# Patient Record
Sex: MALE | Race: BLACK OR AFRICAN AMERICAN | NOT HISPANIC OR LATINO | URBAN - METROPOLITAN AREA
[De-identification: names, ages, dates, MRNs, and addresses within clinical notes are randomized per-mention and may not be internally consistent; named-entity substitution may affect disease eponyms.]

---

## 2019-12-21 ENCOUNTER — EMERGENCY (EMERGENCY)
Facility: HOSPITAL | Age: 64
LOS: 1 days | Discharge: HOME | End: 2019-12-21
Attending: EMERGENCY MEDICINE | Admitting: EMERGENCY MEDICINE
Payer: COMMERCIAL

## 2019-12-21 VITALS
SYSTOLIC BLOOD PRESSURE: 122 MMHG | TEMPERATURE: 98 F | DIASTOLIC BLOOD PRESSURE: 86 MMHG | OXYGEN SATURATION: 98 % | HEART RATE: 76 BPM | RESPIRATION RATE: 18 BRPM

## 2019-12-21 VITALS
OXYGEN SATURATION: 100 % | WEIGHT: 238.98 LBS | RESPIRATION RATE: 18 BRPM | TEMPERATURE: 98 F | DIASTOLIC BLOOD PRESSURE: 97 MMHG | HEART RATE: 142 BPM | SYSTOLIC BLOOD PRESSURE: 147 MMHG | HEIGHT: 69 IN

## 2019-12-21 DIAGNOSIS — Z95.5 PRESENCE OF CORONARY ANGIOPLASTY IMPLANT AND GRAFT: Chronic | ICD-10-CM

## 2019-12-21 LAB
ALBUMIN SERPL ELPH-MCNC: 4.4 G/DL — SIGNIFICANT CHANGE UP (ref 3.3–5)
ALP SERPL-CCNC: 93 U/L — SIGNIFICANT CHANGE UP (ref 40–120)
ALT FLD-CCNC: 14 U/L — SIGNIFICANT CHANGE UP (ref 10–45)
ANION GAP SERPL CALC-SCNC: 14 MMOL/L — SIGNIFICANT CHANGE UP (ref 5–17)
APTT BLD: 42.5 SEC — HIGH (ref 27.5–36.3)
AST SERPL-CCNC: 21 U/L — SIGNIFICANT CHANGE UP (ref 10–40)
BASOPHILS # BLD AUTO: 0.04 K/UL — SIGNIFICANT CHANGE UP (ref 0–0.2)
BASOPHILS NFR BLD AUTO: 0.5 % — SIGNIFICANT CHANGE UP (ref 0–2)
BILIRUB SERPL-MCNC: 0.5 MG/DL — SIGNIFICANT CHANGE UP (ref 0.2–1.2)
BUN SERPL-MCNC: 20 MG/DL — SIGNIFICANT CHANGE UP (ref 7–23)
CALCIUM SERPL-MCNC: 9.7 MG/DL — SIGNIFICANT CHANGE UP (ref 8.4–10.5)
CHLORIDE SERPL-SCNC: 102 MMOL/L — SIGNIFICANT CHANGE UP (ref 96–108)
CK MB CFR SERPL CALC: 5.9 NG/ML — SIGNIFICANT CHANGE UP (ref 0–6.7)
CK SERPL-CCNC: 368 U/L — HIGH (ref 30–200)
CO2 SERPL-SCNC: 23 MMOL/L — SIGNIFICANT CHANGE UP (ref 22–31)
CREAT SERPL-MCNC: 1.15 MG/DL — SIGNIFICANT CHANGE UP (ref 0.5–1.3)
EOSINOPHIL # BLD AUTO: 0.21 K/UL — SIGNIFICANT CHANGE UP (ref 0–0.5)
EOSINOPHIL NFR BLD AUTO: 2.9 % — SIGNIFICANT CHANGE UP (ref 0–6)
GLUCOSE SERPL-MCNC: 155 MG/DL — HIGH (ref 70–99)
HCT VFR BLD CALC: 41 % — SIGNIFICANT CHANGE UP (ref 39–50)
HGB BLD-MCNC: 12.6 G/DL — LOW (ref 13–17)
IMM GRANULOCYTES NFR BLD AUTO: 0.7 % — SIGNIFICANT CHANGE UP (ref 0–1.5)
INR BLD: 1.87 — HIGH (ref 0.88–1.16)
LYMPHOCYTES # BLD AUTO: 1.3 K/UL — SIGNIFICANT CHANGE UP (ref 1–3.3)
LYMPHOCYTES # BLD AUTO: 17.8 % — SIGNIFICANT CHANGE UP (ref 13–44)
MCHC RBC-ENTMCNC: 26.5 PG — LOW (ref 27–34)
MCHC RBC-ENTMCNC: 30.7 GM/DL — LOW (ref 32–36)
MCV RBC AUTO: 86.3 FL — SIGNIFICANT CHANGE UP (ref 80–100)
MONOCYTES # BLD AUTO: 0.67 K/UL — SIGNIFICANT CHANGE UP (ref 0–0.9)
MONOCYTES NFR BLD AUTO: 9.2 % — SIGNIFICANT CHANGE UP (ref 2–14)
NEUTROPHILS # BLD AUTO: 5.02 K/UL — SIGNIFICANT CHANGE UP (ref 1.8–7.4)
NEUTROPHILS NFR BLD AUTO: 68.9 % — SIGNIFICANT CHANGE UP (ref 43–77)
NRBC # BLD: 0 /100 WBCS — SIGNIFICANT CHANGE UP (ref 0–0)
PLATELET # BLD AUTO: 316 K/UL — SIGNIFICANT CHANGE UP (ref 150–400)
POTASSIUM SERPL-MCNC: 3.8 MMOL/L — SIGNIFICANT CHANGE UP (ref 3.5–5.3)
POTASSIUM SERPL-SCNC: 3.8 MMOL/L — SIGNIFICANT CHANGE UP (ref 3.5–5.3)
PROT SERPL-MCNC: 7.9 G/DL — SIGNIFICANT CHANGE UP (ref 6–8.3)
PROTHROM AB SERPL-ACNC: 21.5 SEC — HIGH (ref 10–12.9)
RBC # BLD: 4.75 M/UL — SIGNIFICANT CHANGE UP (ref 4.2–5.8)
RBC # FLD: 14.2 % — SIGNIFICANT CHANGE UP (ref 10.3–14.5)
SODIUM SERPL-SCNC: 139 MMOL/L — SIGNIFICANT CHANGE UP (ref 135–145)
TROPONIN T SERPL-MCNC: 0.01 NG/ML — SIGNIFICANT CHANGE UP (ref 0–0.01)
TROPONIN T SERPL-MCNC: 0.02 NG/ML — HIGH (ref 0–0.01)
WBC # BLD: 7.29 K/UL — SIGNIFICANT CHANGE UP (ref 3.8–10.5)
WBC # FLD AUTO: 7.29 K/UL — SIGNIFICANT CHANGE UP (ref 3.8–10.5)

## 2019-12-21 PROCEDURE — 80053 COMPREHEN METABOLIC PANEL: CPT

## 2019-12-21 PROCEDURE — 82553 CREATINE MB FRACTION: CPT

## 2019-12-21 PROCEDURE — 82550 ASSAY OF CK (CPK): CPT

## 2019-12-21 PROCEDURE — 93010 ELECTROCARDIOGRAM REPORT: CPT

## 2019-12-21 PROCEDURE — 93005 ELECTROCARDIOGRAM TRACING: CPT

## 2019-12-21 PROCEDURE — 85025 COMPLETE CBC W/AUTO DIFF WBC: CPT

## 2019-12-21 PROCEDURE — 99291 CRITICAL CARE FIRST HOUR: CPT

## 2019-12-21 PROCEDURE — 36415 COLL VENOUS BLD VENIPUNCTURE: CPT

## 2019-12-21 PROCEDURE — 96375 TX/PRO/DX INJ NEW DRUG ADDON: CPT

## 2019-12-21 PROCEDURE — 71045 X-RAY EXAM CHEST 1 VIEW: CPT | Mod: 26

## 2019-12-21 PROCEDURE — 96374 THER/PROPH/DIAG INJ IV PUSH: CPT

## 2019-12-21 PROCEDURE — 85730 THROMBOPLASTIN TIME PARTIAL: CPT

## 2019-12-21 PROCEDURE — 99291 CRITICAL CARE FIRST HOUR: CPT | Mod: 25

## 2019-12-21 PROCEDURE — 85610 PROTHROMBIN TIME: CPT

## 2019-12-21 PROCEDURE — 84484 ASSAY OF TROPONIN QUANT: CPT

## 2019-12-21 PROCEDURE — 71045 X-RAY EXAM CHEST 1 VIEW: CPT

## 2019-12-21 RX ORDER — DILTIAZEM HCL 120 MG
20 CAPSULE, EXT RELEASE 24 HR ORAL ONCE
Refills: 0 | Status: COMPLETED | OUTPATIENT
Start: 2019-12-21 | End: 2019-12-21

## 2019-12-21 RX ORDER — ADENOSINE 3 MG/ML
6 INJECTION INTRAVENOUS ONCE
Refills: 0 | Status: COMPLETED | OUTPATIENT
Start: 2019-12-21 | End: 2019-12-21

## 2019-12-21 RX ORDER — SODIUM CHLORIDE 9 MG/ML
1000 INJECTION INTRAMUSCULAR; INTRAVENOUS; SUBCUTANEOUS ONCE
Refills: 0 | Status: COMPLETED | OUTPATIENT
Start: 2019-12-21 | End: 2019-12-21

## 2019-12-21 RX ORDER — METOPROLOL TARTRATE 50 MG
5 TABLET ORAL ONCE
Refills: 0 | Status: COMPLETED | OUTPATIENT
Start: 2019-12-21 | End: 2019-12-21

## 2019-12-21 RX ADMIN — Medication 20 MILLIGRAM(S): at 14:06

## 2019-12-21 RX ADMIN — SODIUM CHLORIDE 1000 MILLILITER(S): 9 INJECTION INTRAMUSCULAR; INTRAVENOUS; SUBCUTANEOUS at 13:29

## 2019-12-21 RX ADMIN — ADENOSINE 6 MILLIGRAM(S): 3 INJECTION INTRAVENOUS at 13:15

## 2019-12-21 RX ADMIN — Medication 5 MILLIGRAM(S): at 13:23

## 2019-12-21 NOTE — ED ADULT NURSE REASSESSMENT NOTE - NS ED NURSE REASSESS COMMENT FT1
Patient resting comfortably, no chest pain, SOB or palpitations, AFib on cardiac monitor, vital signs stable, HR and BP improved.  Repeat Trop 1630H pending.

## 2019-12-21 NOTE — ED ADULT NURSE REASSESSMENT NOTE - NS ED NURSE REASSESS COMMENT FT1
Patient a/oX 3, no chest pain or SOB or palpitations.  Cardizem 20mg IVP adminsitered as ordered by  Director w/ good effects, HR improved to 70  /71.  Dr. Beckham made aware and repeat EKG given to MD.  For repeat Troponin 1630H.  Stable and comfortable, will continue to monitor.

## 2019-12-21 NOTE — ED PROVIDER NOTE - CLINICAL SUMMARY MEDICAL DECISION MAKING FREE TEXT BOX
Pt sent for asymptomatic tachycardia, ekg w reg, rapid, narrow complex w/o consistent p waves - suspect aflutter but ? mat, no sx to support sinus tach.  Will try adenosine, consider beta blocker iv since pt on po, labs, discuss w pt's covering ep.  Reassess.

## 2019-12-21 NOTE — ED ADULT NURSE REASSESSMENT NOTE - NS ED NURSE REASSESS COMMENT FT1
Patient a/oX 3, states feeling better, no chest pain or palpitations, vital signs stable.  2ndTrop resulted.  Discharged to home in stable condition.

## 2019-12-21 NOTE — ED PROVIDER NOTE - OBJECTIVE STATEMENT
63 yo male h/o afib/flutter s/p cardioversion, cardiomyopathy, cad s/p stent, htn, dm, pvd sent from his work physical for asymptomatic tachycardia.  No c/o cp, palpitations, sob, dizziness, syncope.  Pt feeling well.  EKG read as sinus tach at office.  Pt reports compliance w his meds.  No n/v/d, fever, black/bloody stools.

## 2019-12-21 NOTE — ED ADULT NURSE NOTE - CHPI ED NUR SYMPTOMS NEG
no chills/no congestion/no chest pain/no syncope/no shortness of breath/no vomiting/no diaphoresis/no nausea/no dizziness/no fever/no back pain

## 2019-12-21 NOTE — ED PROVIDER NOTE - PROGRESS NOTE DETAILS
Pt w/ ?able flutter w adenosine, no change in hr w metoprolol iv but improved hr after cardizem 20.  Pt discussed w Dr Santos (covering for Dr Nolen) - he recommends increasing his metoprolol ER 50 mg to bid and continuing his xarelto.  He feels his trop elevation is likely rate related; he also feels pt can be dc'd even if hr increases again and fu on Monday in the office.  Will plan to trend trop (initial 0.02) and monitor hr.  Pt remains asymptomatic.  Repeat ekg after cardizem w a flutter, 71 bpm. Repeat trop improved, hr ro.  Sameer rand.

## 2019-12-21 NOTE — ED PROVIDER NOTE - DIAGNOSTIC INTERPRETATION
ER Physician:  Leti Director  CHEST XRAY INTERPRETATION: lungs clear, heart shadow normal, bony structures intact

## 2019-12-21 NOTE — ED ADULT NURSE NOTE - PMH
Atrial flutter  Atrial flutter  Essential hypertension  Hypertension  Primary cardiomyopathy  Cardiomyopathy  Type 2 diabetes mellitus  Diabetes

## 2019-12-21 NOTE — ED PROVIDER NOTE - PATIENT PORTAL LINK FT
You can access the FollowMyHealth Patient Portal offered by [facility] [place] by registering at the following website: [email or web address]. By joining [facility]’s FollowMyHealth portal, you will also be able to view your health information using other applications (apps) compatible with our system.

## 2019-12-21 NOTE — ED PROVIDER NOTE - NSFOLLOWUPINSTRUCTIONS_ED_ALL_ED_FT
Atrial Flutter    Please increase your metoprolol ER 50 mg to twice a day.  Continue your other medications as before including your xarelto.  Follow up with Dr Milan on Monday - call at 9a to get an appointment time; tell them you were in the ER and need to be seen.  Return for increased pain, difficulty breathing, palpitations, any other concerns.     Atrial Flutter     Atrial flutter is a type of abnormal heart rhythm (arrhythmia). The heart has an electrical system that tells the heart how to beat. In atrial flutter, the signals move rapidly in the top chambers of the heart (the atria). This makes your heart beat very fast. Atrial flutter can come and go, or it can be permanent.  If this condition is not treated it can cause serious complications, such as stroke or weakened heart muscle (cardiomyopathy).  What are the causes?  This condition may be caused by:  A heart condition or problem, such as:  A heart attack.Heart failure.A heart valve problem.Heart surgery.A lung problem, such as:  A blood clot in the lungs (pulmonary embolism, or PE).Chronic obstructive pulmonary disease.Poorly controlled high blood pressure (hypertension).Overactive thyroid (hyperthyroidism).Caffeine.Some decongestant cold medicines.Low levels of minerals called electrolytes in the blood.Cocaine.What increases the risk?  You are more likely to develop this condition if:  You are an elderly adult.You are a man.You are obese.You have obstructive sleep apnea.You have a family history of atrial flutter.You have diabetes.What are the signs or symptoms?  Symptoms of this condition include:  A feeling that your heart is pounding or racing (palpitations).Shortness of breath.Chest pain.Feeling light-headed.Dizziness.Fainting.Low blood pressure (hypotension).Fatigue.Sometimes there are no symptoms associated with arrhythmia.  How is this diagnosed?  This condition may be diagnosed based on:  An electrocardiogram (ECG). This is a test that records the electrical signals in the heart.Ambulatory cardiac monitoring. This is a small recording device that is connected by wires to flat, sticky disks (electrodes) that are attached to your chest.An echocardiogram. This is a test that uses sound waves to create pictures of your heart.A transesophageal echocardiogram (KUMAR). In this test, a device is placed down your esophagus. This device then uses sounds waves to create even closer pictures of your heart.Stress test. This test records your heartbeat while you exercise and checks to see if the heart muscle is receiving adequate blood supply.How is this treated?  This condition may be treated with:  Medicines to:  Make your heart beat more slowly.Keep your heart in normal rhythm.Prevent a stroke.Cardioversion. This uses medicines or an electrical shock to make the heart beat normally.Ablation. This destroys the heart tissue that is causing the problem.In some cases, your health care provider will treat other underlying conditions.  Follow these instructions at home:  Medicines     Take over-the-counter and prescription medicines only as told by your health care provider.  Make sure you take your medicines exactly as told by your health care provider.Do not miss any doses.Do not take any new medicines without talking to your health care provider.Lifestyle     Eat heart-healthy foods. Talk with a dietitian to make an eating plan that is right for you.Do not use any products that contain nicotine or tobacco, such as cigarettes and e-cigarettes. If you need help quitting, ask your health care provider.Limit alcohol intake to no more than 1 drink per day for nonpregnant women and 2 drinks per day for men. One drink equals 12 oz of beer, 5 oz of wine, or 1½ oz of hard liquor.Try to reduce any stress. Stress can make your symptoms worse.Get screened for sleep apnea. If you have the condition, work with your health care provider to find a treatment that works for you.Do not use drugs.Avoid excessive caffeine.General instructions     Lose weight if your health care provider tells you to do that.Keep all follow-up visits as told by your health care provider. This is important.Contact a health care provider if:  Your symptoms get worse.You notice that your palpitations are increasing.Get help right away if:  You have any symptoms of a stroke. "BE FAST" is an easy way to remember the main warning signs of a stroke:  B - Balance. Signs are dizziness, sudden trouble walking, or loss of balance.E - Eyes. Signs are trouble seeing or a sudden change in vision.F - Face. Signs are sudden weakness or numbness of the face, or the face or eyelid drooping on one side.A - Arms. Signs are weakness or numbness in an arm. This happens suddenly and usually on one side of the body.S - Speech. Signs are sudden trouble speaking, slurred speech, or trouble understanding what people say.T - Time. Time to call emergency services. Write down what time symptoms started.You have other signs of a stroke, such as:  A sudden, severe headache with no known cause.Nausea or vomiting.Seizure.You have additional symptoms, such as:  Fainting.Shortness of breath.Pain or pressure in your chest.Suddenly feeling nauseous or suddenly vomiting.Increased sweating with no known cause.These symptoms may represent a serious problem that is an emergency. Do not wait to see if the symptoms will go away. Get medical help right away. Call your local emergency services (911 in the [place] Do not drive yourself to the hospital. Summary  Atrial flutter is an abnormal heart rhythm that can give you symptoms of palpitations, shortness of breath, or fatigue. Atrial flutter is often treated with medicines to keep your heart in a normal rhythm and to prevent a stroke.You should seek immediate help if you cannot catch your breath, have chest pain or pressure, or have weakness, especially on one side of your body.

## 2019-12-21 NOTE — ED ADULT NURSE NOTE - OBJECTIVE STATEMENT
Patient sent from PMD Dr. Chris, [facility] went for regular check up and noted on EKG elevated HR of 142, denies any chest pain, dizziness/syncope, no SOB or palpitations, no anxiety, no abdominal pain, non-diaphoretic, no nausea/vomitting, no fevers.States took Xarelto and Metoprolol as scheduled, Hx. of Afib and cardiac stent X 1 2015.  States the past month dyspnea on exertion.   Immediate upgrade,  Director at bedside.

## 2019-12-21 NOTE — ED PROVIDER NOTE - CARE PROVIDER_API CALL
Dru Nolen)  Cardiac Electrophysiology; Cardiology; Cardiovascular Disease  [address], [address] [place] [address]  Phone: [phone]  Fax: [phone]  Follow Up Time:

## 2019-12-21 NOTE — ED ADULT NURSE REASSESSMENT NOTE - NS ED NURSE REASSESS COMMENT FT1
Patient a/oX 3, non-anxious, non-diaphoretic, denies any chest pain , SOB or palpitations or dizziness. Afib on cardiac monitor, vital signs stable.  Right AC PIV #18 in place, all labs sent, no complications.   Director at bedside, administered VORB ADenosine 6mg rapid IVP 2 -3 sec followed by 20ml NSS flush and right arm elevated.  Rhythm strip printed, patient HR remains 140, tolerated procedure well.  VORB  Director Metoprolol 5mg slow IVP over 1 min administered as ordered;  Dr. Beckham made aware patient HR remains 140  /87.  Patient tolerated procedure well, no chest pain , SOB or any other symptom complaint.  NSS 1 L bolus ongoing, will continue to monitor.

## 2019-12-21 NOTE — ED ADULT TRIAGE NOTE - CHIEF COMPLAINT QUOTE
63 y/o male sent from PMD for palpitations. Pt . Pt denies chest pain, SOB. Pt reports that he was in a routine Doctor's visit when he was told he has rapid HR. Pt reports palpitations.

## 2019-12-21 NOTE — ED PROVIDER NOTE - PMH
Atrial flutter  Atrial flutter  CAD (coronary artery disease)    Essential hypertension  Hypertension  Primary cardiomyopathy  Cardiomyopathy  PVD (peripheral vascular disease)    Type 2 diabetes mellitus  Diabetes

## 2019-12-23 PROBLEM — I73.9 PERIPHERAL VASCULAR DISEASE, UNSPECIFIED: Chronic | Status: ACTIVE | Noted: 2019-12-21

## 2019-12-23 PROBLEM — I25.10 ATHEROSCLEROTIC HEART DISEASE OF NATIVE CORONARY ARTERY WITHOUT ANGINA PECTORIS: Chronic | Status: ACTIVE | Noted: 2019-12-21

## 2019-12-25 DIAGNOSIS — I48.92 UNSPECIFIED ATRIAL FLUTTER: ICD-10-CM

## 2019-12-25 DIAGNOSIS — R00.2 PALPITATIONS: ICD-10-CM

## 2020-01-08 ENCOUNTER — NON-APPOINTMENT (OUTPATIENT)
Age: 65
End: 2020-01-08

## 2020-01-08 ENCOUNTER — APPOINTMENT (OUTPATIENT)
Dept: HEART AND VASCULAR | Facility: CLINIC | Age: 65
End: 2020-01-08
Payer: COMMERCIAL

## 2020-01-08 VITALS
WEIGHT: 243 LBS | HEART RATE: 137 BPM | HEIGHT: 70 IN | DIASTOLIC BLOOD PRESSURE: 75 MMHG | SYSTOLIC BLOOD PRESSURE: 115 MMHG | BODY MASS INDEX: 34.79 KG/M2

## 2020-01-08 DIAGNOSIS — Z78.9 OTHER SPECIFIED HEALTH STATUS: ICD-10-CM

## 2020-01-08 PROCEDURE — 99215 OFFICE O/P EST HI 40 MIN: CPT | Mod: 25

## 2020-01-08 PROCEDURE — 93000 ELECTROCARDIOGRAM COMPLETE: CPT

## 2020-01-08 RX ORDER — OMEPRAZOLE 40 MG/1
40 CAPSULE, DELAYED RELEASE ORAL
Refills: 0 | Status: ACTIVE | COMMUNITY
Start: 2020-01-08

## 2020-01-08 RX ORDER — ASPIRIN ENTERIC COATED TABLETS 81 MG 81 MG/1
81 TABLET, DELAYED RELEASE ORAL
Refills: 0 | Status: ACTIVE | COMMUNITY
Start: 2020-01-08

## 2020-01-08 RX ORDER — SILDENAFIL 100 MG/1
100 TABLET, FILM COATED ORAL
Refills: 0 | Status: ACTIVE | COMMUNITY
Start: 2020-01-08

## 2020-01-10 ENCOUNTER — OUTPATIENT (OUTPATIENT)
Dept: OUTPATIENT SERVICES | Facility: HOSPITAL | Age: 65
LOS: 1 days | Discharge: HOME | End: 2020-01-10
Payer: COMMERCIAL

## 2020-01-10 DIAGNOSIS — Z95.5 PRESENCE OF CORONARY ANGIOPLASTY IMPLANT AND GRAFT: Chronic | ICD-10-CM

## 2020-01-10 PROCEDURE — 92960 CARDIOVERSION ELECTRIC EXT: CPT

## 2020-01-13 NOTE — HISTORY OF PRESENT ILLNESS
[FreeTextEntry1] : Mr Banks is a 64 year old male with HTN, diabetes and atrial flutter s/p ablation 2015, who was recently found to be in atrial fibrillation, who presents for follow up.\par \par He is maintained on xarelto.  He recently had a routine MD office appointment and was found to be in atrial fibrillation.  He denies any symptoms including palpitations, fatigue, SOB, syncope or near syncope.  He was recently restarted back on Metoprolol \par

## 2020-01-13 NOTE — DISCUSSION/SUMMARY
[FreeTextEntry1] : Mr Banks is a 64 year old male with HTN, diabetes and atrial flutter s/p ablation 2015, who was recently found to be in atrial fibrillation, who presents for follow up.  He is currently asymptomatic and compliant with anticoagulation with newly diagnosed atrial fibrillation.  We discussed the natural conduction system of the heart and what atrial flutter and atrial fibrillation is.  I would like to get him back into NSR to see if he notes a change in the way he feels.  He is amenable to proceeding with a cardioversion with low dose Sotalol.  If he notes an improvement we will proceed with an ablation in the future.  We discussed the procedure in detail including risks, benefits and alternatives.  He knows to call with any questions or concerns.

## 2020-01-13 NOTE — REVIEW OF SYSTEMS
[Fever] : no fever [Recent Weight Gain (___ Lbs)] : no recent weight gain [Chills] : no chills [Feeling Fatigued] : not feeling fatigued [Recent Weight Loss (___ Lbs)] : no recent weight loss [Negative] : Heme/Lymph

## 2020-01-13 NOTE — PHYSICAL EXAM
[General Appearance - In No Acute Distress] : no acute distress [Normal Oral Mucosa] : normal oral mucosa [Normal Conjunctiva] : the conjunctiva exhibited no abnormalities [Heart Rate And Rhythm] : heart rate and rhythm were normal [Heart Sounds] : normal S1 and S2 [Abnormal Walk] : normal gait [Nail Clubbing] : no clubbing of the fingernails [Skin Color & Pigmentation] : normal skin color and pigmentation [Oriented To Time, Place, And Person] : oriented to person, place, and time [General Appearance - Well Developed] : well developed [Normal Appearance] : normal appearance [Well Groomed] : well groomed [General Appearance - Well Nourished] : well nourished [No Deformities] : no deformities [Respiration, Rhythm And Depth] : normal respiratory rhythm and effort [Exaggerated Use Of Accessory Muscles For Inspiration] : no accessory muscle use [Auscultation Breath Sounds / Voice Sounds] : lungs were clear to auscultation bilaterally [Edema] : no peripheral edema present [] : no ischemic changes [Gait - Sufficient For Exercise Testing] : the gait was sufficient for exercise testing [Affect] : the affect was normal [Impaired Insight] : insight and judgment were intact [No Anxiety] : not feeling anxious [Mood] : the mood was normal

## 2020-01-21 DIAGNOSIS — I48.91 UNSPECIFIED ATRIAL FIBRILLATION: ICD-10-CM

## 2020-01-22 ENCOUNTER — APPOINTMENT (OUTPATIENT)
Dept: HEART AND VASCULAR | Facility: CLINIC | Age: 65
End: 2020-01-22
Payer: COMMERCIAL

## 2020-01-22 ENCOUNTER — NON-APPOINTMENT (OUTPATIENT)
Age: 65
End: 2020-01-22

## 2020-01-22 VITALS
BODY MASS INDEX: 34.36 KG/M2 | WEIGHT: 240 LBS | HEART RATE: 63 BPM | SYSTOLIC BLOOD PRESSURE: 132 MMHG | HEIGHT: 70 IN | DIASTOLIC BLOOD PRESSURE: 66 MMHG

## 2020-01-22 PROCEDURE — 93000 ELECTROCARDIOGRAM COMPLETE: CPT

## 2020-01-22 PROCEDURE — 99214 OFFICE O/P EST MOD 30 MIN: CPT | Mod: 25

## 2020-01-27 NOTE — HISTORY OF PRESENT ILLNESS
[FreeTextEntry1] : Mr Banks is a 64 year old male with HTN, diabetes and atrial flutter s/p ablation 2015, who was found to be in atrial fibrillation s/p cardioversion 1/10/20, who presents for follow up.\par \par He is maintained on xarelto.  He was found to be in asymptomatic atrial fibrillation and was found to be in afib.  He denied any palpitations, fatigue, SOB, syncope or near syncope.  He underwent a cardioversion and since then states he feels more energy when walking up stairs.  No other changes.  COmpliant with oral anticoagulation. \par

## 2020-01-27 NOTE — PHYSICAL EXAM
[Normal Appearance] : normal appearance [General Appearance - Well Developed] : well developed [Well Groomed] : well groomed [No Deformities] : no deformities [General Appearance - Well Nourished] : well nourished [Normal Conjunctiva] : the conjunctiva exhibited no abnormalities [General Appearance - In No Acute Distress] : no acute distress [Respiration, Rhythm And Depth] : normal respiratory rhythm and effort [Normal Oral Mucosa] : normal oral mucosa [Heart Rate And Rhythm] : heart rate and rhythm were normal [Auscultation Breath Sounds / Voice Sounds] : lungs were clear to auscultation bilaterally [Exaggerated Use Of Accessory Muscles For Inspiration] : no accessory muscle use [Edema] : no peripheral edema present [Abnormal Walk] : normal gait [Heart Sounds] : normal S1 and S2 [Gait - Sufficient For Exercise Testing] : the gait was sufficient for exercise testing [Skin Color & Pigmentation] : normal skin color and pigmentation [Impaired Insight] : insight and judgment were intact [Oriented To Time, Place, And Person] : oriented to person, place, and time [Affect] : the affect was normal [Mood] : the mood was normal [No Anxiety] : not feeling anxious [] : no rash

## 2020-01-27 NOTE — REVIEW OF SYSTEMS
[Negative] : Heme/Lymph [Fever] : no fever [Recent Weight Gain (___ Lbs)] : no recent weight gain [Chills] : no chills [Feeling Fatigued] : not feeling fatigued [Recent Weight Loss (___ Lbs)] : no recent weight loss

## 2020-01-27 NOTE — DISCUSSION/SUMMARY
[FreeTextEntry1] : Mr Banks is a 64 year old male with HTN, diabetes and atrial flutter s/p ablation 2015, who was recently found to be in atrial fibrillation s/p cardioversion, who presents for follow up.  He notes an improvement in the way he feels post cardioversion.  We discussed that this arrhythmia is likely to come back at some point and we are unclear when.  Options include observation, antiarrhythmic medications or an ablation.  He would like to wait and see how he does and if/when he has recurrent arrhythmia will consider an ablation.  He will follow up in 6 months or sooner if needed.  He knows to call with any questions or concerns.

## 2021-10-28 ENCOUNTER — INPATIENT (INPATIENT)
Facility: HOSPITAL | Age: 66
LOS: 1 days | Discharge: HOME | DRG: 274 | End: 2021-10-30
Attending: INTERNAL MEDICINE | Admitting: INTERNAL MEDICINE
Payer: COMMERCIAL

## 2021-10-28 VITALS
HEIGHT: 69 IN | SYSTOLIC BLOOD PRESSURE: 162 MMHG | TEMPERATURE: 98 F | RESPIRATION RATE: 18 BRPM | HEART RATE: 121 BPM | DIASTOLIC BLOOD PRESSURE: 116 MMHG | OXYGEN SATURATION: 99 % | WEIGHT: 223.99 LBS

## 2021-10-28 DIAGNOSIS — E11.9 TYPE 2 DIABETES MELLITUS WITHOUT COMPLICATIONS: ICD-10-CM

## 2021-10-28 DIAGNOSIS — Z95.5 PRESENCE OF CORONARY ANGIOPLASTY IMPLANT AND GRAFT: Chronic | ICD-10-CM

## 2021-10-28 DIAGNOSIS — I48.91 UNSPECIFIED ATRIAL FIBRILLATION: ICD-10-CM

## 2021-10-28 LAB
ALBUMIN SERPL ELPH-MCNC: 4.7 G/DL — SIGNIFICANT CHANGE UP (ref 3.3–5)
ALP SERPL-CCNC: 77 U/L — SIGNIFICANT CHANGE UP (ref 40–120)
ALT FLD-CCNC: 10 U/L — SIGNIFICANT CHANGE UP (ref 10–45)
ANION GAP SERPL CALC-SCNC: 12 MMOL/L — SIGNIFICANT CHANGE UP (ref 5–17)
APTT BLD: 34.5 SEC — SIGNIFICANT CHANGE UP (ref 27.5–35.5)
AST SERPL-CCNC: 19 U/L — SIGNIFICANT CHANGE UP (ref 10–40)
BASOPHILS # BLD AUTO: 0.03 K/UL — SIGNIFICANT CHANGE UP (ref 0–0.2)
BASOPHILS NFR BLD AUTO: 0.5 % — SIGNIFICANT CHANGE UP (ref 0–2)
BILIRUB SERPL-MCNC: 0.3 MG/DL — SIGNIFICANT CHANGE UP (ref 0.2–1.2)
BUN SERPL-MCNC: 24 MG/DL — HIGH (ref 7–23)
CALCIUM SERPL-MCNC: 9.4 MG/DL — SIGNIFICANT CHANGE UP (ref 8.4–10.5)
CHLORIDE SERPL-SCNC: 105 MMOL/L — SIGNIFICANT CHANGE UP (ref 96–108)
CO2 SERPL-SCNC: 22 MMOL/L — SIGNIFICANT CHANGE UP (ref 22–31)
CREAT SERPL-MCNC: 1.15 MG/DL — SIGNIFICANT CHANGE UP (ref 0.5–1.3)
EOSINOPHIL # BLD AUTO: 0.26 K/UL — SIGNIFICANT CHANGE UP (ref 0–0.5)
EOSINOPHIL NFR BLD AUTO: 4.1 % — SIGNIFICANT CHANGE UP (ref 0–6)
GLUCOSE BLDC GLUCOMTR-MCNC: 172 MG/DL — HIGH (ref 70–99)
GLUCOSE BLDC GLUCOMTR-MCNC: 84 MG/DL — SIGNIFICANT CHANGE UP (ref 70–99)
GLUCOSE SERPL-MCNC: 131 MG/DL — HIGH (ref 70–99)
HCT VFR BLD CALC: 41.5 % — SIGNIFICANT CHANGE UP (ref 39–50)
HGB BLD-MCNC: 13.3 G/DL — SIGNIFICANT CHANGE UP (ref 13–17)
IMM GRANULOCYTES NFR BLD AUTO: 0.5 % — SIGNIFICANT CHANGE UP (ref 0–1.5)
INR BLD: 1.2 — HIGH (ref 0.88–1.16)
LYMPHOCYTES # BLD AUTO: 1.1 K/UL — SIGNIFICANT CHANGE UP (ref 1–3.3)
LYMPHOCYTES # BLD AUTO: 17.2 % — SIGNIFICANT CHANGE UP (ref 13–44)
MCHC RBC-ENTMCNC: 28.2 PG — SIGNIFICANT CHANGE UP (ref 27–34)
MCHC RBC-ENTMCNC: 32 GM/DL — SIGNIFICANT CHANGE UP (ref 32–36)
MCV RBC AUTO: 88.1 FL — SIGNIFICANT CHANGE UP (ref 80–100)
MONOCYTES # BLD AUTO: 0.67 K/UL — SIGNIFICANT CHANGE UP (ref 0–0.9)
MONOCYTES NFR BLD AUTO: 10.5 % — SIGNIFICANT CHANGE UP (ref 2–14)
NEUTROPHILS # BLD AUTO: 4.3 K/UL — SIGNIFICANT CHANGE UP (ref 1.8–7.4)
NEUTROPHILS NFR BLD AUTO: 67.2 % — SIGNIFICANT CHANGE UP (ref 43–77)
NRBC # BLD: 0 /100 WBCS — SIGNIFICANT CHANGE UP (ref 0–0)
PLATELET # BLD AUTO: 226 K/UL — SIGNIFICANT CHANGE UP (ref 150–400)
POTASSIUM SERPL-MCNC: 4.4 MMOL/L — SIGNIFICANT CHANGE UP (ref 3.5–5.3)
POTASSIUM SERPL-SCNC: 4.4 MMOL/L — SIGNIFICANT CHANGE UP (ref 3.5–5.3)
PROT SERPL-MCNC: 7.6 G/DL — SIGNIFICANT CHANGE UP (ref 6–8.3)
PROTHROM AB SERPL-ACNC: 14.3 SEC — HIGH (ref 10.6–13.6)
RBC # BLD: 4.71 M/UL — SIGNIFICANT CHANGE UP (ref 4.2–5.8)
RBC # FLD: 13.2 % — SIGNIFICANT CHANGE UP (ref 10.3–14.5)
SARS-COV-2 RNA SPEC QL NAA+PROBE: NEGATIVE — SIGNIFICANT CHANGE UP
SODIUM SERPL-SCNC: 139 MMOL/L — SIGNIFICANT CHANGE UP (ref 135–145)
TROPONIN T SERPL-MCNC: 0.01 NG/ML — SIGNIFICANT CHANGE UP (ref 0–0.01)
WBC # BLD: 6.39 K/UL — SIGNIFICANT CHANGE UP (ref 3.8–10.5)
WBC # FLD AUTO: 6.39 K/UL — SIGNIFICANT CHANGE UP (ref 3.8–10.5)

## 2021-10-28 PROCEDURE — 93010 ELECTROCARDIOGRAM REPORT: CPT

## 2021-10-28 PROCEDURE — 75572 CT HRT W/3D IMAGE: CPT | Mod: 26

## 2021-10-28 PROCEDURE — 71045 X-RAY EXAM CHEST 1 VIEW: CPT | Mod: 26

## 2021-10-28 PROCEDURE — 99285 EMERGENCY DEPT VISIT HI MDM: CPT

## 2021-10-28 PROCEDURE — 99255 IP/OBS CONSLTJ NEW/EST HI 80: CPT

## 2021-10-28 RX ORDER — SOTALOL HCL 120 MG
40 TABLET ORAL
Refills: 0 | Status: DISCONTINUED | OUTPATIENT
Start: 2021-10-28 | End: 2021-10-30

## 2021-10-28 RX ORDER — GLUCAGON INJECTION, SOLUTION 0.5 MG/.1ML
1 INJECTION, SOLUTION SUBCUTANEOUS ONCE
Refills: 0 | Status: DISCONTINUED | OUTPATIENT
Start: 2021-10-28 | End: 2021-10-30

## 2021-10-28 RX ORDER — AMLODIPINE BESYLATE 2.5 MG/1
0 TABLET ORAL
Qty: 0 | Refills: 0 | DISCHARGE

## 2021-10-28 RX ORDER — METOPROLOL TARTRATE 50 MG
50 TABLET ORAL ONCE
Refills: 0 | Status: COMPLETED | OUTPATIENT
Start: 2021-10-28 | End: 2021-10-28

## 2021-10-28 RX ORDER — DEXTROSE 50 % IN WATER 50 %
25 SYRINGE (ML) INTRAVENOUS ONCE
Refills: 0 | Status: DISCONTINUED | OUTPATIENT
Start: 2021-10-28 | End: 2021-10-30

## 2021-10-28 RX ORDER — AMLODIPINE BESYLATE 2.5 MG/1
2.5 TABLET ORAL DAILY
Refills: 0 | Status: DISCONTINUED | OUTPATIENT
Start: 2021-10-28 | End: 2021-10-30

## 2021-10-28 RX ORDER — METOPROLOL TARTRATE 50 MG
50 TABLET ORAL DAILY
Refills: 0 | Status: DISCONTINUED | OUTPATIENT
Start: 2021-10-29 | End: 2021-10-30

## 2021-10-28 RX ORDER — ASPIRIN/CALCIUM CARB/MAGNESIUM 324 MG
81 TABLET ORAL DAILY
Refills: 0 | Status: DISCONTINUED | OUTPATIENT
Start: 2021-10-28 | End: 2021-10-30

## 2021-10-28 RX ORDER — SODIUM CHLORIDE 9 MG/ML
1000 INJECTION INTRAMUSCULAR; INTRAVENOUS; SUBCUTANEOUS
Refills: 0 | Status: DISCONTINUED | OUTPATIENT
Start: 2021-10-28 | End: 2021-10-29

## 2021-10-28 RX ORDER — HYDROCHLOROTHIAZIDE 25 MG
0 TABLET ORAL
Qty: 0 | Refills: 0 | DISCHARGE

## 2021-10-28 RX ORDER — RIVAROXABAN 15 MG-20MG
0 KIT ORAL
Qty: 0 | Refills: 0 | DISCHARGE

## 2021-10-28 RX ORDER — METOPROLOL TARTRATE 50 MG
0 TABLET ORAL
Qty: 0 | Refills: 0 | DISCHARGE

## 2021-10-28 RX ORDER — RIVAROXABAN 15 MG-20MG
1 KIT ORAL
Qty: 0 | Refills: 0 | DISCHARGE

## 2021-10-28 RX ORDER — RIVAROXABAN 15 MG-20MG
20 KIT ORAL
Refills: 0 | Status: DISCONTINUED | OUTPATIENT
Start: 2021-10-28 | End: 2021-10-29

## 2021-10-28 RX ORDER — SODIUM CHLORIDE 9 MG/ML
1000 INJECTION, SOLUTION INTRAVENOUS
Refills: 0 | Status: DISCONTINUED | OUTPATIENT
Start: 2021-10-28 | End: 2021-10-30

## 2021-10-28 RX ORDER — ASPIRIN/CALCIUM CARB/MAGNESIUM 324 MG
0 TABLET ORAL
Qty: 0 | Refills: 0 | DISCHARGE

## 2021-10-28 RX ORDER — METOPROLOL TARTRATE 50 MG
5 TABLET ORAL ONCE
Refills: 0 | Status: COMPLETED | OUTPATIENT
Start: 2021-10-28 | End: 2021-10-28

## 2021-10-28 RX ORDER — DEXTROSE 50 % IN WATER 50 %
12.5 SYRINGE (ML) INTRAVENOUS ONCE
Refills: 0 | Status: DISCONTINUED | OUTPATIENT
Start: 2021-10-28 | End: 2021-10-30

## 2021-10-28 RX ORDER — PANTOPRAZOLE SODIUM 20 MG/1
40 TABLET, DELAYED RELEASE ORAL
Refills: 0 | Status: DISCONTINUED | OUTPATIENT
Start: 2021-10-28 | End: 2021-10-30

## 2021-10-28 RX ORDER — DEXTROSE 50 % IN WATER 50 %
15 SYRINGE (ML) INTRAVENOUS ONCE
Refills: 0 | Status: DISCONTINUED | OUTPATIENT
Start: 2021-10-28 | End: 2021-10-30

## 2021-10-28 RX ORDER — SOTALOL HCL 120 MG
0.5 TABLET ORAL
Qty: 0 | Refills: 0 | DISCHARGE

## 2021-10-28 RX ORDER — INSULIN LISPRO 100/ML
VIAL (ML) SUBCUTANEOUS
Refills: 0 | Status: DISCONTINUED | OUTPATIENT
Start: 2021-10-28 | End: 2021-10-30

## 2021-10-28 RX ADMIN — Medication 5 MILLIGRAM(S): at 13:22

## 2021-10-28 RX ADMIN — SODIUM CHLORIDE 200 MILLILITER(S): 9 INJECTION INTRAMUSCULAR; INTRAVENOUS; SUBCUTANEOUS at 13:21

## 2021-10-28 RX ADMIN — Medication 50 MILLIGRAM(S): at 13:22

## 2021-10-28 RX ADMIN — Medication 40 MILLIGRAM(S): at 18:38

## 2021-10-28 RX ADMIN — RIVAROXABAN 20 MILLIGRAM(S): KIT at 18:38

## 2021-10-28 RX ADMIN — Medication 5 MILLIGRAM(S): at 14:29

## 2021-10-28 NOTE — H&P ADULT - HISTORY OF PRESENT ILLNESS
66 year old male with history of  HTN, diabetes , CAD , s/p stent,  atrial flutter s/p ablation 2015, who was found to be in atrial fibrillation s/p cardioversion 1/10/20 presented today to ED with complains of palpitations and found to be in atrial fibrillation with RVR.     He is maintained on xarelto.  He was found to be in asymptomatic atrial fibrillation and was found to be in afib.  He denied any palpitations, fatigue, SOB, syncope or near syncope.  He underwent a cardioversion and since then states he feels more energy when walking up stairs.  No other changes.  COmpliant with oral anticoagulation.     66 year old male with history of  HTN, diabetes , CAD , s/p stent,  atrial flutter s/p ablation 2015, who was found to be in atrial fibrillation s/p cardioversion 1/10/20 presented today to ED with complains of palpitations and found to be in atrial fibrillation with RVR.   Patient states that he had flu shot last Monday and after started to feel that his heart is racing. He states that he has been taking his medications (Xarelto and Sotalol but didn't have any improvement and decided to go to ED.   Patient was found to be in atrial fibrillation with HR up to 130 BPM.  No complains of chest pain,  SOB, dizziness, syncope.     66 year old male with history of  HTN, diabetes , CAD , s/p stent,  atrial flutter s/p ablation 2015, who was found to be in atrial fibrillation s/p cardioversion 1/10/20 presented today to ED with complains of palpitations and found to be in atrial fibrillation with RVR. Patients has been having recurrent palpitations lasting for hours and was planning to see Dr Nolen at the [facility] in consideration of paroxysmal symptomatic atrial fibrillation.   Patient states that he had flu shot a few days ago and after started to feel that his heart is racing. He states that he has been taking his medications (Xarelto and Sotalol but didn't have any improvement and decided to go to ED.   Patient was found to be in atrial fibrillation with HR up to 130 BPM.  No complains of chest pain,  SOB, dizziness, syncope.

## 2021-10-28 NOTE — ED ADULT NURSE NOTE - NSICDXPASTMEDICALHX_GEN_ALL_CORE_FT
PAST MEDICAL HISTORY:  Atrial flutter Atrial flutter    CAD (coronary artery disease)     Essential hypertension Hypertension    Primary cardiomyopathy Cardiomyopathy    PVD (peripheral vascular disease)     Type 2 diabetes mellitus Diabetes

## 2021-10-28 NOTE — H&P ADULT - ATTENDING COMMENTS
Patient with flutter post ablation and subsequent paroxysmal atrial fibrillation. He failed cardioversion and Sotalol. He has been on uninterupted anticoagulation with Xarelto. No episodes more than a week.  Atrial fibrillation currently appears to be coarse and more difficult to late control possibly due to effects of Sotalol. We discussed options of repeat cardioversion vs ablation, he opted to proceed with ablation.

## 2021-10-28 NOTE — H&P ADULT - PROBLEM SELECTOR PLAN 1
Admit to telemetry, will get echo, CT heart with coronaries  continue Xarelto/Sotalol/Metoprolol   NPO after MN for ablation in am

## 2021-10-28 NOTE — ED PROVIDER NOTE - OBJECTIVE STATEMENT
The pt is a 65 y/o M, who presents to ED c/o "fast heart rate" x few d. Pt states symptoms started after getting the flu shot, states that is aware that his HR is fast, otherwise feels well. PMH: HTN, DM, CAD (1 stent yrs ago), atrial flutter (2015 - ablation), a fib (2020 - ablation, xarelto). Denies any cp, sob, dizziness, syncope, n/v/d, abd pain, leg pain or swelling, recent travel, fevers, chills.

## 2021-10-28 NOTE — ED PROVIDER NOTE - ATTENDING CONTRIBUTION TO CARE
67 yo M hx of Afib/flutter on Xyrelto on BB p/w palpitations since taking flu shot Monday.  No chest pain or sob.  Pt well appearing.  Clear lungs, no JVD, regular s1s2.  EKG showing flutter 2:1 at 122.  Plan check lytes, trop, give rate control and likely dc.

## 2021-10-28 NOTE — PATIENT PROFILE ADULT - VISION (WITH CORRECTIVE LENSES IF THE PATIENT USUALLY WEARS THEM):
Bladder non-tender and non-distended. Normal vision: sees adequately in most situations; can see medication labels, newsprint

## 2021-10-28 NOTE — PATIENT PROFILE ADULT - HISTORY OF COVID-19 VACCINATION
A (Cath Impulse 6f 110cm Pig) catheter was used to non-selectively engage and inject the abdominal aorta by power injection.   Bilateral run off  Yes

## 2021-10-28 NOTE — ED ADULT NURSE NOTE - OBJECTIVE STATEMENT
Patient presents alert and oriented to person, place, time, and situation to ED c/o dyspnea on exertion and tachycardia since receiving an outpatient EKG yesterday. Patient denies SOB at this time but HR is still tachycardic in the 120s. Patient denies chest pain, SOB, nausea, vomiting, or dizziness. PMH Atrial Flutter, PVD, CAD, HTN, DM, 1 cardiac stent (1 year ago). NKA status confirmed. Patient is vaccinated against COVID. Patient resting in bed at this time in no acute distress connected to cardiac monitor.

## 2021-10-28 NOTE — H&P ADULT - EXTREMITIES
Reason For Visit  MATT ANDINO is here today for a nurse visit for immunizations Pt here requesting high dose flu vaccine and Pneumovax 23. Pt feels fine today, denies adverse reaction to previous vaccines.      Current Meds   1. Azithromycin 250 MG Oral Tablet; TAKE 2 TABLETS BY MOUTH ON DAY ONE, THEN   TAKE 1 TABLET BY MOUTH ONCE A DAY FOR 4 DAYS;   Therapy: 27Uzh9653 to (Evaluate:28Awd6033)  Requested for: 77Soa4910; Last   Rx:[id number] Ordered   2. Cheratussin -10 MG/5ML Oral Syrup; TAKE 1 TEASPOONFUL BY MOUTH EVERY   4 TO 6 HOURS;   Therapy: 10Mar2015 to (Evaluate:93Dum9283); Last Rx:[id number] Ordered   3. Meloxicam 7.5 MG Oral Tablet; TAKE 1 to 2  TABLETS (7.5 MG) BY ORAL ROUTE ONCE   DAILY;   Therapy: 11May2016 to (Evaluate:77Uux9546) Recorded   4. ProAir  (90 Base) MCG/ACT Inhalation Aerosol Solution; INHALE 1 TO 2 PUFFS   EVERY 6 HOURS AS NEEDED;   Therapy: 10Mar2015 to (Last Rx:[id number])  Requested for: 83Xkn4317 Ordered   5. Transderm-Scop (1.5 MG) 1 MG/3DAYS Transdermal Patch 72 Hour; USE AS   DIRECTED;   Therapy: 09Dun4864 to (Last Rx:[id number])  Requested for: [id number] Ordered   6. Tylenol 325 MG Oral Tablet;   Therapy: 02Ato1130 to Recorded    Allergies  No Known Drug Allergies    Assessment   1. Need for influenza vaccination (Z23)   2. Encounter for preventive health examination (Z00.00)   3. Need for pneumococcal vaccination (Z23)    Plan   1. Fluzone High-Dose 0.5 ML Intramuscular Suspension Prefilled Syringe   2. Pneumovax 23 25 MCG/0.5ML Injection Injectable    Signatures   Electronically signed by : VASHTI AMBROCIO L.P.N.; Nov 7 2017  1:14PM CST (Author)     detailed exam

## 2021-10-28 NOTE — ED ADULT TRIAGE NOTE - CHIEF COMPLAINT QUOTE
pt presents reporting TRINH and tachycardia on outpatient EKG yesterday. Pt reports hx of 1 cardiac stent placed 1 yr ago, hx atrial flutter. Pt on xarelto and sotalol.

## 2021-10-28 NOTE — ED PROVIDER NOTE - CLINICAL SUMMARY MEDICAL DECISION MAKING FREE TEXT BOX
pt c/o palpitations x few d, hx of afib/flutter/chf/cad/htn/dm, no actual cp/sob/dizziness/syncope, well appearing but tachy - ekg aflutter, given iv metoprolol and po metoprolol and gentle ivf - HR improved (90s) but then started to increase again, remains in aflutter/fib, labs wnl, cxr clear, EP called since pt has seen dr harvey in past -- will admit for ablation

## 2021-10-28 NOTE — H&P ADULT - ASSESSMENT
66 year old male with history of  HTN, diabetes , CAD , s/p stent,  atrial flutter s/p ablation 2015, who was found to be in atrial fibrillation s/p cardioversion 1/10/20 presented today to ED with complains of palpitations and found to be in atrial fibrillation with RVR.

## 2021-10-29 LAB
COVID-19 SPIKE DOMAIN AB INTERP: POSITIVE
COVID-19 SPIKE DOMAIN ANTIBODY RESULT: >250 U/ML — HIGH
GLUCOSE BLDC GLUCOMTR-MCNC: 145 MG/DL — HIGH (ref 70–99)
GLUCOSE BLDC GLUCOMTR-MCNC: 222 MG/DL — HIGH (ref 70–99)
GLUCOSE BLDC GLUCOMTR-MCNC: 90 MG/DL — SIGNIFICANT CHANGE UP (ref 70–99)
HCV AB S/CO SERPL IA: 0.04 S/CO — SIGNIFICANT CHANGE UP
HCV AB SERPL-IMP: SIGNIFICANT CHANGE UP
SARS-COV-2 IGG+IGM SERPL QL IA: >250 U/ML — HIGH
SARS-COV-2 IGG+IGM SERPL QL IA: POSITIVE

## 2021-10-29 PROCEDURE — 93613 INTRACARDIAC EPHYS 3D MAPG: CPT

## 2021-10-29 PROCEDURE — 93655 ICAR CATH ABLTJ DSCRT ARRHYT: CPT

## 2021-10-29 PROCEDURE — 93656 COMPRE EP EVAL ABLTJ ATR FIB: CPT

## 2021-10-29 PROCEDURE — 93662 INTRACARDIAC ECG (ICE): CPT | Mod: 26

## 2021-10-29 PROCEDURE — 99232 SBSQ HOSP IP/OBS MODERATE 35: CPT

## 2021-10-29 PROCEDURE — 93308 TTE F-UP OR LMTD: CPT | Mod: 26

## 2021-10-29 PROCEDURE — 93657 TX L/R ATRIAL FIB ADDL: CPT

## 2021-10-29 RX ORDER — APIXABAN 2.5 MG/1
5 TABLET, FILM COATED ORAL EVERY 12 HOURS
Refills: 0 | Status: DISCONTINUED | OUTPATIENT
Start: 2021-10-30 | End: 2021-10-30

## 2021-10-29 RX ADMIN — Medication 40 MILLIGRAM(S): at 18:12

## 2021-10-29 RX ADMIN — Medication 40 MILLIGRAM(S): at 06:34

## 2021-10-29 RX ADMIN — AMLODIPINE BESYLATE 2.5 MILLIGRAM(S): 2.5 TABLET ORAL at 06:34

## 2021-10-29 RX ADMIN — PANTOPRAZOLE SODIUM 40 MILLIGRAM(S): 20 TABLET, DELAYED RELEASE ORAL at 06:33

## 2021-10-29 RX ADMIN — RIVAROXABAN 20 MILLIGRAM(S): KIT at 18:12

## 2021-10-29 RX ADMIN — Medication 50 MILLIGRAM(S): at 06:34

## 2021-10-29 NOTE — CHART NOTE - NSCHARTNOTEFT_GEN_A_CORE
[facility] [place]  [id number]    PROCEDURE:  atrial fibrillation ablation   atypical atrial flutter ablation       INDICATION:  symptomatic paroxysaml atrial fibrillation   left and right femoral vein access      ELECTROPHYSIOLOGIST(S):  Dr. Jerman Costello   Fellow: Dr. Erickson         ANESTHESIOLOGY:  General       FINDINGS:  Patient presented to the lab in atrial tachycardia  elcetroanatomical mapping revealed a left atrial micro-re entrant tachycardia, originating at the base of the left atrial appendage that terminated with ablation   successful PVI of all for pulmonary veins  hemostasis achieved with vascade mvp .     COMPLICATIONS:   none       RECOMMENDATIONS:  bed rest for 3 hours   monitor b/l groins   resume apixaban 5 mg bid in 4 hours  continue all other medications

## 2021-10-30 ENCOUNTER — TRANSCRIPTION ENCOUNTER (OUTPATIENT)
Age: 66
End: 2021-10-30

## 2021-10-30 VITALS — TEMPERATURE: 98 F

## 2021-10-30 LAB
GLUCOSE BLDC GLUCOMTR-MCNC: 164 MG/DL — HIGH (ref 70–99)
GLUCOSE BLDC GLUCOMTR-MCNC: 247 MG/DL — HIGH (ref 70–99)

## 2021-10-30 PROCEDURE — C1769: CPT

## 2021-10-30 PROCEDURE — 85730 THROMBOPLASTIN TIME PARTIAL: CPT

## 2021-10-30 PROCEDURE — 80053 COMPREHEN METABOLIC PANEL: CPT

## 2021-10-30 PROCEDURE — 36415 COLL VENOUS BLD VENIPUNCTURE: CPT

## 2021-10-30 PROCEDURE — 71045 X-RAY EXAM CHEST 1 VIEW: CPT

## 2021-10-30 PROCEDURE — C1894: CPT

## 2021-10-30 PROCEDURE — C1730: CPT

## 2021-10-30 PROCEDURE — 93306 TTE W/DOPPLER COMPLETE: CPT

## 2021-10-30 PROCEDURE — C1733: CPT

## 2021-10-30 PROCEDURE — 99285 EMERGENCY DEPT VISIT HI MDM: CPT

## 2021-10-30 PROCEDURE — 82962 GLUCOSE BLOOD TEST: CPT

## 2021-10-30 PROCEDURE — C1731: CPT

## 2021-10-30 PROCEDURE — 86803 HEPATITIS C AB TEST: CPT

## 2021-10-30 PROCEDURE — 85610 PROTHROMBIN TIME: CPT

## 2021-10-30 PROCEDURE — C1759: CPT

## 2021-10-30 PROCEDURE — C1760: CPT

## 2021-10-30 PROCEDURE — C2630: CPT

## 2021-10-30 PROCEDURE — 86769 SARS-COV-2 COVID-19 ANTIBODY: CPT

## 2021-10-30 PROCEDURE — C1766: CPT

## 2021-10-30 PROCEDURE — 87635 SARS-COV-2 COVID-19 AMP PRB: CPT

## 2021-10-30 PROCEDURE — 84484 ASSAY OF TROPONIN QUANT: CPT

## 2021-10-30 PROCEDURE — 75572 CT HRT W/3D IMAGE: CPT

## 2021-10-30 PROCEDURE — 85025 COMPLETE CBC W/AUTO DIFF WBC: CPT

## 2021-10-30 RX ORDER — SODIUM CHLORIDE 9 MG/ML
1000 INJECTION, SOLUTION INTRAVENOUS
Qty: 0 | Refills: 0 | DISCHARGE
Start: 2021-10-30

## 2021-10-30 RX ORDER — ACETAMINOPHEN 500 MG
650 TABLET ORAL ONCE
Refills: 0 | Status: COMPLETED | OUTPATIENT
Start: 2021-10-30 | End: 2021-10-30

## 2021-10-30 RX ADMIN — Medication 81 MILLIGRAM(S): at 11:31

## 2021-10-30 RX ADMIN — Medication 40 MILLIGRAM(S): at 06:15

## 2021-10-30 RX ADMIN — PANTOPRAZOLE SODIUM 40 MILLIGRAM(S): 20 TABLET, DELAYED RELEASE ORAL at 06:14

## 2021-10-30 RX ADMIN — Medication 50 MILLIGRAM(S): at 06:14

## 2021-10-30 RX ADMIN — AMLODIPINE BESYLATE 2.5 MILLIGRAM(S): 2.5 TABLET ORAL at 06:16

## 2021-10-30 RX ADMIN — Medication 650 MILLIGRAM(S): at 06:14

## 2021-10-30 NOTE — DISCHARGE NOTE PROVIDER - CARE PROVIDERS DIRECT ADDRESSES
[email or web address]

## 2021-10-30 NOTE — DISCHARGE NOTE PROVIDER - NSDCMRMEDTOKEN_GEN_ALL_CORE_FT
aspirin 81 mg oral tablet: orally once a day  Dextrose 5% in Water intravenous solution: 1000 milliliter(s) intravenous   Dextrose 5% in Water intravenous solution: 1000 milliliter(s) intravenous   glipiZIDE:   hydroCHLOROthiazide:   Janumet:   lisinopril:   metFORMIN:   Metoprolol Succinate ER 50 mg oral tablet, extended release: 1 tab(s) orally once a day  Norvasc 2.5 mg oral tablet: 1 tab(s) orally once a day  omeprazole 40 mg oral delayed release capsule: 1 cap(s) orally once a day  sildenafil 100 mg oral tablet: 1 tab(s) orally once a day  sotalol 80 mg oral tablet: 0.5 tab(s) orally 2 times a day  Xarelto 20 mg oral tablet: 1 tab(s) orally once a day (in the evening)

## 2021-10-30 NOTE — DISCHARGE NOTE NURSING/CASE MANAGEMENT/SOCIAL WORK - PATIENT PORTAL LINK FT
You can access the FollowMyHealth Patient Portal offered by [facility] by registering at the following website: [email or web address]. By joining [facility]’s FollowMyHealth portal, you will also be able to view your health information using other applications (apps) compatible with our system.

## 2021-10-30 NOTE — DISCHARGE NOTE PROVIDER - HOSPITAL COURSE
66 year old male with history of  HTN, diabetes , CAD , s/p stent,  atrial flutter s/p ablation 2015, who was found to be in atrial fibrillation s/p cardioversion 1/10/20 presented today to ED with complains of palpitations and found to be in atrial fibrillation with RVR.      Problem/Plan - 1:  ·  Problem: Atrial fibrillation.   ·  Plan: Admit to telemetry, will get echo, CT heart with coronaries  continue Xarelto/Sotalol/Metoprolol     Patient presented to the lab in atrial tachycardia  elcetroanatomical mapping revealed a left atrial micro-re entrant tachycardia, originating at the base of the left atrial appendage that terminated with ablation   successful PVI of all for pulmonary veins  hemostasis achieved with vascade mvp .        Problem/Plan - 2:  ·  Problem: DM (diabetes mellitus).   ·  Plan: c/w home meds         66 year old male with history of  HTN, diabetes , CAD , s/p stent,  atrial flutter s/p ablation 2015, who was found to be in atrial fibrillation s/p cardioversion 1/10/20 presented today to ED with complains of palpitations and found to be in atrial fibrillation with RVR.      Problem/Plan - 1:  ·  Problem: Atrial fibrillation.   ·  Plan: Admit to telemetry, will get echo, CT heart with coronaries  continue Xarelto/Sotalol/Metoprolol     Patient presented to the lab in atrial tachycardia  elcetroanatomical mapping revealed a left atrial micro-re entrant tachycardia, originating at the base of the left atrial appendage that terminated with RF ablation   successful PVI of all for pulmonary veins  hemostasis achieved with vascade mvp .        Problem/Plan - 2:  ·  Problem: DM (diabetes mellitus).   ·  Plan: c/w home meds

## 2021-10-30 NOTE — DISCHARGE NOTE PROVIDER - CARE PROVIDER_API CALL
Jerman Costello E  CARDIAC ELECTROPHYSIOLOGY  [address] [place] [address]  Phone: [phone]  Fax: [phone]  Follow Up Time: 2 weeks   Dru Nolen)  Cardiac Electrophysiology  [address], [address] [place] [address]  Phone: [phone]  Fax: [phone]  Follow Up Time:

## 2021-10-30 NOTE — DISCHARGE NOTE PROVIDER - NSDCCPCAREPLAN_GEN_ALL_CORE_FT
PRINCIPAL DISCHARGE DIAGNOSIS  Diagnosis: Chronic atrial fibrillation  Assessment and Plan of Treatment: You have a known diagnosis of atrial fibrillation prior to your admission. This condition, if not treated, increases your risk of stroke or heart attack. You underwent a procedure called an ablation to help treat your heart condition. Please continue to take your metoprolol and Xarelto. Please make sure to continue taking these medications to avoid developing blood clots and to lower your risk of stroke. Additionally be sure to follow up with Dr. Costello as indicated on this discharge packet        SECONDARY DISCHARGE DIAGNOSES  Diagnosis: Palpitations  Assessment and Plan of Treatment:      PRINCIPAL DISCHARGE DIAGNOSIS  Diagnosis: Chronic atrial fibrillation  Assessment and Plan of Treatment: You have a known diagnosis of atrial fibrillation prior to your admission. This condition, if not treated, increases your risk of stroke or heart attack. You underwent a procedure called an ablation to help treat your heart condition. Please continue to take your metoprolol and Xarelto. Please make sure to continue taking these medications to avoid developing blood clots and to lower your risk of stroke. Additionally be sure to follow up with Dr. Costello as indicated on this discharge packet

## 2021-11-05 ENCOUNTER — NON-APPOINTMENT (OUTPATIENT)
Age: 66
End: 2021-11-05

## 2021-11-05 DIAGNOSIS — I48.20 CHRONIC ATRIAL FIBRILLATION, UNSPECIFIED: ICD-10-CM

## 2021-11-05 DIAGNOSIS — I10 ESSENTIAL (PRIMARY) HYPERTENSION: ICD-10-CM

## 2021-11-05 DIAGNOSIS — Z79.82 LONG TERM (CURRENT) USE OF ASPIRIN: ICD-10-CM

## 2021-11-05 DIAGNOSIS — Z95.5 PRESENCE OF CORONARY ANGIOPLASTY IMPLANT AND GRAFT: ICD-10-CM

## 2021-11-05 DIAGNOSIS — K21.9 GASTRO-ESOPHAGEAL REFLUX DISEASE WITHOUT ESOPHAGITIS: ICD-10-CM

## 2021-11-05 DIAGNOSIS — I42.9 CARDIOMYOPATHY, UNSPECIFIED: ICD-10-CM

## 2021-11-05 DIAGNOSIS — I48.4 ATYPICAL ATRIAL FLUTTER: ICD-10-CM

## 2021-11-05 DIAGNOSIS — E66.9 OBESITY, UNSPECIFIED: ICD-10-CM

## 2021-11-05 DIAGNOSIS — Z79.84 LONG TERM (CURRENT) USE OF ORAL HYPOGLYCEMIC DRUGS: ICD-10-CM

## 2021-11-05 DIAGNOSIS — Z79.01 LONG TERM (CURRENT) USE OF ANTICOAGULANTS: ICD-10-CM

## 2021-11-05 DIAGNOSIS — I47.1 SUPRAVENTRICULAR TACHYCARDIA: ICD-10-CM

## 2021-11-05 DIAGNOSIS — E11.51 TYPE 2 DIABETES MELLITUS WITH DIABETIC PERIPHERAL ANGIOPATHY WITHOUT GANGRENE: ICD-10-CM

## 2021-11-05 DIAGNOSIS — I25.10 ATHEROSCLEROTIC HEART DISEASE OF NATIVE CORONARY ARTERY WITHOUT ANGINA PECTORIS: ICD-10-CM

## 2021-11-19 ENCOUNTER — APPOINTMENT (OUTPATIENT)
Dept: VASCULAR SURGERY | Facility: CLINIC | Age: 66
End: 2021-11-19
Payer: COMMERCIAL

## 2021-11-19 ENCOUNTER — APPOINTMENT (OUTPATIENT)
Dept: HEART AND VASCULAR | Facility: CLINIC | Age: 66
End: 2021-11-19
Payer: COMMERCIAL

## 2021-11-19 VITALS
BODY MASS INDEX: 32.64 KG/M2 | DIASTOLIC BLOOD PRESSURE: 82 MMHG | TEMPERATURE: 97.3 F | SYSTOLIC BLOOD PRESSURE: 149 MMHG | HEART RATE: 63 BPM | HEIGHT: 70 IN | WEIGHT: 228 LBS

## 2021-11-19 DIAGNOSIS — R60.0 LOCALIZED EDEMA: ICD-10-CM

## 2021-11-19 PROCEDURE — 99213 OFFICE O/P EST LOW 20 MIN: CPT

## 2021-11-19 PROCEDURE — 93971 EXTREMITY STUDY: CPT

## 2021-11-19 NOTE — PHYSICAL EXAM
[] : no ischemic changes [FreeTextEntry1] : LLE pitting edema 2 to 3+. Tenderness at left groin. No bleeding.

## 2021-11-19 NOTE — HISTORY OF PRESENT ILLNESS
[FreeTextEntry1] : 66 year old male with history of  HTN, diabetes , CAD , s/p stent,  atrial flutter s/p ablation 2015, who was found to be in atrial fibrillation s/p cardioversion 1/10/20 presented to ED on 10/28/21 with complains of palpitations and found to be in atrial fibrillation with RVR. He has been taking Xarelto and Sotalol but didn't help so decided to go to ER. He ended up having EPS. He was enrolled in the Quaker trial (Cryo vs Farapulse) and was randomized to the cryoablation.  Also had left AT ablation. Prior CTI line remained isolated.  Xarelto was not interrupted post procedure. \par He called office with concern for left leg swelling and tenderness at the groin area.  No bleeding or weakness.  \par No CP / palp / SOB.

## 2021-11-19 NOTE — DISCUSSION/SUMMARY
[FreeTextEntry1] : 67 y/o M s/p EPS and ablation of AFIB (cryo) plus left AT ablation on 10/29/21. On Xarelto and now has left LE swelling and groin tenderness.  Plan for urgent U/S duplex of LE to rule out DVT, hematoma and pseudoaneurysm given recent procedure.  
x2/50 feet

## 2021-11-30 ENCOUNTER — TRANSCRIPTION ENCOUNTER (OUTPATIENT)
Age: 66
End: 2021-11-30

## 2021-11-30 ENCOUNTER — INPATIENT (INPATIENT)
Facility: HOSPITAL | Age: 66
LOS: 1 days | Discharge: HOME | DRG: 617 | End: 2021-12-02
Attending: HOSPITALIST | Admitting: HOSPITALIST
Payer: COMMERCIAL

## 2021-11-30 VITALS
WEIGHT: 231.93 LBS | OXYGEN SATURATION: 99 % | SYSTOLIC BLOOD PRESSURE: 167 MMHG | TEMPERATURE: 98 F | DIASTOLIC BLOOD PRESSURE: 81 MMHG | HEART RATE: 78 BPM | RESPIRATION RATE: 18 BRPM | HEIGHT: 69 IN

## 2021-11-30 DIAGNOSIS — Z29.9 ENCOUNTER FOR PROPHYLACTIC MEASURES, UNSPECIFIED: ICD-10-CM

## 2021-11-30 DIAGNOSIS — M86.10 OTHER ACUTE OSTEOMYELITIS, UNSPECIFIED SITE: ICD-10-CM

## 2021-11-30 DIAGNOSIS — I48.92 UNSPECIFIED ATRIAL FLUTTER: ICD-10-CM

## 2021-11-30 DIAGNOSIS — I10 ESSENTIAL (PRIMARY) HYPERTENSION: ICD-10-CM

## 2021-11-30 DIAGNOSIS — S30.1XXA CONTUSION OF ABDOMINAL WALL, INITIAL ENCOUNTER: ICD-10-CM

## 2021-11-30 DIAGNOSIS — Z95.5 PRESENCE OF CORONARY ANGIOPLASTY IMPLANT AND GRAFT: Chronic | ICD-10-CM

## 2021-11-30 DIAGNOSIS — E11.9 TYPE 2 DIABETES MELLITUS WITHOUT COMPLICATIONS: ICD-10-CM

## 2021-11-30 LAB
ALBUMIN SERPL ELPH-MCNC: 4.5 G/DL — SIGNIFICANT CHANGE UP (ref 3.3–5)
ALP SERPL-CCNC: 91 U/L — SIGNIFICANT CHANGE UP (ref 40–120)
ALT FLD-CCNC: 9 U/L — LOW (ref 10–45)
ANION GAP SERPL CALC-SCNC: 10 MMOL/L — SIGNIFICANT CHANGE UP (ref 5–17)
APTT BLD: 39.5 SEC — HIGH (ref 27.5–35.5)
AST SERPL-CCNC: 14 U/L — SIGNIFICANT CHANGE UP (ref 10–40)
BASOPHILS # BLD AUTO: 0.02 K/UL — SIGNIFICANT CHANGE UP (ref 0–0.2)
BASOPHILS NFR BLD AUTO: 0.2 % — SIGNIFICANT CHANGE UP (ref 0–2)
BILIRUB SERPL-MCNC: 0.4 MG/DL — SIGNIFICANT CHANGE UP (ref 0.2–1.2)
BUN SERPL-MCNC: 22 MG/DL — SIGNIFICANT CHANGE UP (ref 7–23)
CALCIUM SERPL-MCNC: 9.3 MG/DL — SIGNIFICANT CHANGE UP (ref 8.4–10.5)
CHLORIDE SERPL-SCNC: 101 MMOL/L — SIGNIFICANT CHANGE UP (ref 96–108)
CK SERPL-CCNC: 249 U/L — HIGH (ref 30–200)
CO2 SERPL-SCNC: 26 MMOL/L — SIGNIFICANT CHANGE UP (ref 22–31)
CREAT SERPL-MCNC: 1.17 MG/DL — SIGNIFICANT CHANGE UP (ref 0.5–1.3)
CRP SERPL-MCNC: 63.3 MG/L — HIGH (ref 0–4)
EOSINOPHIL # BLD AUTO: 0.16 K/UL — SIGNIFICANT CHANGE UP (ref 0–0.5)
EOSINOPHIL NFR BLD AUTO: 1.7 % — SIGNIFICANT CHANGE UP (ref 0–6)
ERYTHROCYTE [SEDIMENTATION RATE] IN BLOOD: 70 MM/HR — HIGH
GLUCOSE SERPL-MCNC: 240 MG/DL — HIGH (ref 70–99)
GRAM STN FLD: SIGNIFICANT CHANGE UP
HCT VFR BLD CALC: 35.7 % — LOW (ref 39–50)
HGB BLD-MCNC: 11.5 G/DL — LOW (ref 13–17)
IMM GRANULOCYTES NFR BLD AUTO: 0.5 % — SIGNIFICANT CHANGE UP (ref 0–1.5)
INR BLD: 1.43 — HIGH (ref 0.88–1.16)
LYMPHOCYTES # BLD AUTO: 0.94 K/UL — LOW (ref 1–3.3)
LYMPHOCYTES # BLD AUTO: 10.2 % — LOW (ref 13–44)
MCHC RBC-ENTMCNC: 28.8 PG — SIGNIFICANT CHANGE UP (ref 27–34)
MCHC RBC-ENTMCNC: 32.2 GM/DL — SIGNIFICANT CHANGE UP (ref 32–36)
MCV RBC AUTO: 89.3 FL — SIGNIFICANT CHANGE UP (ref 80–100)
MONOCYTES # BLD AUTO: 0.76 K/UL — SIGNIFICANT CHANGE UP (ref 0–0.9)
MONOCYTES NFR BLD AUTO: 8.2 % — SIGNIFICANT CHANGE UP (ref 2–14)
NEUTROPHILS # BLD AUTO: 7.33 K/UL — SIGNIFICANT CHANGE UP (ref 1.8–7.4)
NEUTROPHILS NFR BLD AUTO: 79.2 % — HIGH (ref 43–77)
NRBC # BLD: 0 /100 WBCS — SIGNIFICANT CHANGE UP (ref 0–0)
PLATELET # BLD AUTO: 249 K/UL — SIGNIFICANT CHANGE UP (ref 150–400)
POTASSIUM SERPL-MCNC: 3.8 MMOL/L — SIGNIFICANT CHANGE UP (ref 3.5–5.3)
POTASSIUM SERPL-SCNC: 3.8 MMOL/L — SIGNIFICANT CHANGE UP (ref 3.5–5.3)
PROT SERPL-MCNC: 7.9 G/DL — SIGNIFICANT CHANGE UP (ref 6–8.3)
PROTHROM AB SERPL-ACNC: 16.9 SEC — HIGH (ref 10.6–13.6)
RBC # BLD: 4 M/UL — LOW (ref 4.2–5.8)
RBC # FLD: 12.8 % — SIGNIFICANT CHANGE UP (ref 10.3–14.5)
SARS-COV-2 RNA SPEC QL NAA+PROBE: NEGATIVE — SIGNIFICANT CHANGE UP
SODIUM SERPL-SCNC: 137 MMOL/L — SIGNIFICANT CHANGE UP (ref 135–145)
SPECIMEN SOURCE: SIGNIFICANT CHANGE UP
WBC # BLD: 9.26 K/UL — SIGNIFICANT CHANGE UP (ref 3.8–10.5)
WBC # FLD AUTO: 9.26 K/UL — SIGNIFICANT CHANGE UP (ref 3.8–10.5)

## 2021-11-30 PROCEDURE — 99285 EMERGENCY DEPT VISIT HI MDM: CPT

## 2021-11-30 PROCEDURE — 99223 1ST HOSP IP/OBS HIGH 75: CPT | Mod: GC

## 2021-11-30 PROCEDURE — 73660 X-RAY EXAM OF TOE(S): CPT | Mod: 26,LT

## 2021-11-30 PROCEDURE — 71045 X-RAY EXAM CHEST 1 VIEW: CPT | Mod: 26

## 2021-11-30 RX ORDER — PIPERACILLIN AND TAZOBACTAM 4; .5 G/20ML; G/20ML
4.5 INJECTION, POWDER, LYOPHILIZED, FOR SOLUTION INTRAVENOUS EVERY 6 HOURS
Refills: 0 | Status: DISCONTINUED | OUTPATIENT
Start: 2021-12-01 | End: 2021-12-02

## 2021-11-30 RX ORDER — VANCOMYCIN HCL 1 G
1500 VIAL (EA) INTRAVENOUS EVERY 12 HOURS
Refills: 0 | Status: DISCONTINUED | OUTPATIENT
Start: 2021-12-01 | End: 2021-12-02

## 2021-11-30 RX ORDER — ACETAMINOPHEN 500 MG
650 TABLET ORAL ONCE
Refills: 0 | Status: COMPLETED | OUTPATIENT
Start: 2021-11-30 | End: 2021-11-30

## 2021-11-30 RX ORDER — INSULIN LISPRO 100/ML
VIAL (ML) SUBCUTANEOUS
Refills: 0 | Status: DISCONTINUED | OUTPATIENT
Start: 2021-11-30 | End: 2021-12-02

## 2021-11-30 RX ORDER — SITAGLIPTIN AND METFORMIN HYDROCHLORIDE 500; 50 MG/1; MG/1
1 TABLET, FILM COATED ORAL
Qty: 0 | Refills: 0 | DISCHARGE

## 2021-11-30 RX ORDER — METOPROLOL TARTRATE 50 MG
50 TABLET ORAL DAILY
Refills: 0 | Status: DISCONTINUED | OUTPATIENT
Start: 2021-12-01 | End: 2021-12-01

## 2021-11-30 RX ORDER — SODIUM CHLORIDE 9 MG/ML
1000 INJECTION, SOLUTION INTRAVENOUS
Refills: 0 | Status: DISCONTINUED | OUTPATIENT
Start: 2021-11-30 | End: 2021-12-02

## 2021-11-30 RX ORDER — ENOXAPARIN SODIUM 100 MG/ML
100 INJECTION SUBCUTANEOUS EVERY 12 HOURS
Refills: 0 | Status: DISCONTINUED | OUTPATIENT
Start: 2021-11-30 | End: 2021-12-01

## 2021-11-30 RX ORDER — DEXTROSE 50 % IN WATER 50 %
15 SYRINGE (ML) INTRAVENOUS ONCE
Refills: 0 | Status: DISCONTINUED | OUTPATIENT
Start: 2021-11-30 | End: 2021-12-02

## 2021-11-30 RX ORDER — PIPERACILLIN AND TAZOBACTAM 4; .5 G/20ML; G/20ML
3.38 INJECTION, POWDER, LYOPHILIZED, FOR SOLUTION INTRAVENOUS ONCE
Refills: 0 | Status: COMPLETED | OUTPATIENT
Start: 2021-11-30 | End: 2021-11-30

## 2021-11-30 RX ORDER — AMLODIPINE BESYLATE 2.5 MG/1
5 TABLET ORAL DAILY
Refills: 0 | Status: DISCONTINUED | OUTPATIENT
Start: 2021-12-01 | End: 2021-12-01

## 2021-11-30 RX ORDER — ASPIRIN/CALCIUM CARB/MAGNESIUM 324 MG
81 TABLET ORAL DAILY
Refills: 0 | Status: DISCONTINUED | OUTPATIENT
Start: 2021-11-30 | End: 2021-11-30

## 2021-11-30 RX ORDER — OMEPRAZOLE 10 MG/1
1 CAPSULE, DELAYED RELEASE ORAL
Qty: 0 | Refills: 0 | DISCHARGE

## 2021-11-30 RX ORDER — SOTALOL HCL 120 MG
40 TABLET ORAL EVERY 12 HOURS
Refills: 0 | Status: DISCONTINUED | OUTPATIENT
Start: 2021-11-30 | End: 2021-12-01

## 2021-11-30 RX ORDER — LISINOPRIL 2.5 MG/1
0 TABLET ORAL
Qty: 0 | Refills: 0 | DISCHARGE

## 2021-11-30 RX ORDER — AMLODIPINE BESYLATE 2.5 MG/1
1 TABLET ORAL
Qty: 0 | Refills: 0 | DISCHARGE

## 2021-11-30 RX ORDER — SODIUM CHLORIDE 9 MG/ML
1000 INJECTION INTRAMUSCULAR; INTRAVENOUS; SUBCUTANEOUS ONCE
Refills: 0 | Status: COMPLETED | OUTPATIENT
Start: 2021-11-30 | End: 2021-11-30

## 2021-11-30 RX ORDER — DEXTROSE 50 % IN WATER 50 %
12.5 SYRINGE (ML) INTRAVENOUS ONCE
Refills: 0 | Status: DISCONTINUED | OUTPATIENT
Start: 2021-11-30 | End: 2021-12-02

## 2021-11-30 RX ORDER — DEXTROSE 50 % IN WATER 50 %
25 SYRINGE (ML) INTRAVENOUS ONCE
Refills: 0 | Status: DISCONTINUED | OUTPATIENT
Start: 2021-11-30 | End: 2021-12-02

## 2021-11-30 RX ORDER — LISINOPRIL 2.5 MG/1
20 TABLET ORAL DAILY
Refills: 0 | Status: DISCONTINUED | OUTPATIENT
Start: 2021-12-01 | End: 2021-12-01

## 2021-11-30 RX ORDER — METFORMIN HYDROCHLORIDE 850 MG/1
0 TABLET ORAL
Qty: 0 | Refills: 0 | DISCHARGE

## 2021-11-30 RX ORDER — HYDROCHLOROTHIAZIDE 25 MG
12.5 TABLET ORAL DAILY
Refills: 0 | Status: DISCONTINUED | OUTPATIENT
Start: 2021-12-01 | End: 2021-12-02

## 2021-11-30 RX ORDER — GLUCAGON INJECTION, SOLUTION 0.5 MG/.1ML
1 INJECTION, SOLUTION SUBCUTANEOUS ONCE
Refills: 0 | Status: DISCONTINUED | OUTPATIENT
Start: 2021-11-30 | End: 2021-12-02

## 2021-11-30 RX ORDER — VANCOMYCIN HCL 1 G
1000 VIAL (EA) INTRAVENOUS ONCE
Refills: 0 | Status: COMPLETED | OUTPATIENT
Start: 2021-11-30 | End: 2021-11-30

## 2021-11-30 RX ADMIN — Medication 650 MILLIGRAM(S): at 19:55

## 2021-11-30 RX ADMIN — Medication 250 MILLIGRAM(S): at 20:13

## 2021-11-30 RX ADMIN — PIPERACILLIN AND TAZOBACTAM 200 GRAM(S): 4; .5 INJECTION, POWDER, LYOPHILIZED, FOR SOLUTION INTRAVENOUS at 19:38

## 2021-11-30 RX ADMIN — SODIUM CHLORIDE 1000 MILLILITER(S): 9 INJECTION INTRAMUSCULAR; INTRAVENOUS; SUBCUTANEOUS at 19:38

## 2021-11-30 NOTE — ED PROVIDER NOTE - PROGRESS NOTE DETAILS
Klepfish: Xr w/ scant localized air (open wound), no tracking gas. mild hyperglycemia, other labs grossly wnl. Given diabetes and extent of swelling, will admit for IV abx. Podiatry at bedside. Updated pt.

## 2021-11-30 NOTE — ED ADULT TRIAGE NOTE - CHIEF COMPLAINT QUOTE
Pt co bilateral leg swelling and L food ulcer. Feels leg swelling has worsened since having a cardiac ablation and starting on AC. Pt says foot ulcer developed 2 weeks ago. Denies drainage from site/ odor. Denies f/c, CP, SOB. Pt co bilateral leg swelling and L foot ulcer. Feels leg swelling has worsened since having a cardiac ablation and starting on AC. Pt says foot ulcer developed 2 weeks ago. Denies drainage from site/ odor. Denies f/c, CP, SOB.

## 2021-11-30 NOTE — CONSULT NOTE ADULT - SUBJECTIVE AND OBJECTIVE BOX
Attending: Dr. Kimmy Townsend DPM    Patient is a 66y old  Male who presents with a chief complaint of infected diabetic ulcer     HPI: 66M PMH HTN, DM, CAD w/ stent,  atrial flutter s/p ablation 2015, afib s/p cardioversion 2020, atrial tachycardia s/p RF ablation Oct 2021, now p/w LE swelling. Pt states that 2-3d after ablation he noticed swelling to b/l LE L>R, as well as bruising to b/l groin region. R has since completely resolved, b/l bruising has completely resolved but still has LLE swelling. Went to cards office 11/19/21 and referred for US which pt states was wnl. Pt states that ~1.5-2w ago he sustained cut to L 2nd toe. Has worsening pain to that toe since then. Has persistent LLE swelling. No other systemic symptoms.   	Denies f/c, SOB, CP, URI symptoms, HA, NVD, abd pain, urinary complaints, lightheaded, fatigue, focal weakness/numbness.     Podiatry was consulted for the management of infected diabetic foot ulcer present on the left 2nd toe (distal tip).     Review of systems negative except per HPI    PAST MEDICAL & SURGICAL HISTORY:  Primary cardiomyopathy  Cardiomyopathy    Type 2 diabetes mellitus  Diabetes    Essential hypertension  Hypertension    Atrial flutter  Atrial flutter    CAD (coronary artery disease)    PVD (peripheral vascular disease)    S/P coronary artery stent placement      Home Medications:  aspirin 81 mg oral tablet: orally once a day (28 Oct 2021 15:55)  Dextrose 5% in Water intravenous solution: 1000 milliliter(s) intravenous  (30 Oct 2021 11:10)  Dextrose 5% in Water intravenous solution: 1000 milliliter(s) intravenous  (30 Oct 2021 11:10)  glipiZIDE:  (28 Oct 2021 15:55)  hydroCHLOROthiazide:  (28 Oct 2021 15:55)  Janumet:  (28 Oct 2021 15:55)  lisinopril:  (28 Oct 2021 15:55)  metFORMIN:  (28 Oct 2021 15:55)  Metoprolol Succinate ER 50 mg oral tablet, extended release: 1 tab(s) orally once a day (28 Oct 2021 15:55)  Norvasc 2.5 mg oral tablet: 1 tab(s) orally once a day (28 Oct 2021 15:55)  omeprazole 40 mg oral delayed release capsule: 1 cap(s) orally once a day (28 Oct 2021 15:55)  sildenafil 100 mg oral tablet: 1 tab(s) orally once a day (28 Oct 2021 15:55)  sotalol 80 mg oral tablet: 0.5 tab(s) orally 2 times a day (28 Oct 2021 15:55)  Xarelto 20 mg oral tablet: 1 tab(s) orally once a day (in the evening) (28 Oct 2021 15:55)    Allergies    No Known Allergies    Intolerances      FAMILY HISTORY:    Social History:       LABS                        11.5   9.26  )-----------( 249      ( 30 Nov 2021 19:43 )             35.7     11-30    137  |  101  |  22  ----------------------------<  240<H>  3.8   |  26  |  1.17    Ca    9.3      30 Nov 2021 19:43    TPro  7.9  /  Alb  4.5  /  TBili  0.4  /  DBili  x   /  AST  14  /  ALT  9<L>  /  AlkPhos  91  11-30    PT/INR - ( 30 Nov 2021 19:43 )   PT: 16.9 sec;   INR: 1.43          PTT - ( 30 Nov 2021 19:43 )  PTT:39.5 sec    Vital Signs Last 24 Hrs  T(C): 36.7 (30 Nov 2021 17:22), Max: 36.7 (30 Nov 2021 17:22)  T(F): 98.1 (30 Nov 2021 17:22), Max: 98.1 (30 Nov 2021 17:22)  HR: 78 (30 Nov 2021 17:22) (78 - 78)  BP: 167/81 (30 Nov 2021 17:22) (167/81 - 167/81)  BP(mean): --  RR: 18 (30 Nov 2021 17:22) (18 - 18)  SpO2: 99% (30 Nov 2021 17:22) (99% - 99%)    PHYSICAL EXAM  General: NAD, AA0x3    Lower Extremity Focused:  Vasc: Palpable pulses b/l, WWP, edematous L foot and leg with erythema present +1 pitting edema   Derm: Alamo grade 3 ulcer present on the L 2nd toe- distal aspect. Serous drainage noted. -PTB and no purulence noted. Toe is boggy.   Neuro: Protective sensations mildly diminished   MSK: MMSE 5/5 in all compartments     MRN: [id number]  Age: 66y    Hip Internal ROM R: 40   L:   40  Hip External ROM R: 40  L:    40  Reference: Internal (30-45); External (40-50)    Sagittal Knee Position:  Right: [x ] Normal, [ ] Flexed, [ ] Recurvatum  Left: [x ] Normal, [ ] Flexed, [ ] Recurvatum    Frontal Plane Leg:  Right: [x ] Normal, [ ] Varum, [ ] Valgum  Left: [x ] Normal, [ ] Varum, [ ] Valgum    Malleolar Position:  Right: [x ] External, [ ] Internal  Left: [ x] External, [ ] Internal    Limb Length Discrepancy  Right: [ ] Longer, [ ] Wasta  _0__ cm  Left: [ ] Longer, [ ] Wasta  _0__ cm    Ankle, knee extended  Right: [ x] Normal, [ ] Limited, [ ] Crepitus  Left: [ x] Normal, [ ] Limited, [ ] Crepitus    Ankle, knee flexed  Right: [x ] Normal, [ ] Limited, [ ] Crepitus  Left: [ x] Normal, [ ] Limited, [ ] Crepitus    RCSP  Right: [ ] Vertical, [ ] Varus, [x ] Valgus  Left: [ ] Vertical, [ ] Varus, [ x] Valgus    NCSP:  Right: [ ] Vertical, [ ] Varus, [ x] Valgus  Left: [ ] Vertical, [ ] Varus, [x ] Valgus    STJ ROM:  Right: [x ] Normal, [ ] Limited, [ ] Crepitus  Left: [x ] Normal, [ ] Limited, [ ] Crepitus    MTJ ROM:  Right: [x ] Normal, [ ] Limited, [ ] Crepitus  Left: [x ] Normal, [ ] Limited, [ ] Crepitus    FF to RF Relationship  Right: [x ] Normal, [ ] Varus, [ ] Valgus  Left: [x ] Normal, [ ] Varus, [ ] Valgus    1st Ray Position  Right: [x ] Neutral, [ ] Dorsiflexed, [ ] Plantarflexed, [ ] Hypermobile  Left: [x ] Neutral, [ ] Dorsiflexed, [ ] Plantarflexed, [ ] Hypermobile    1st MTP ROM, loaded  Right: [x ] Normal, [ ] Limited, [ ] Crepitus  Left: [ x] Normal, [ ] Limited, [ ] Crepitus    1st MTP ROM, unloaded  Right: [x ] Normal, [ ] Limited, [ ] Crepitus  Left: [x ] Normal, [ ] Limited, [ ] Crepitus    Muscle Strength  Posterior Group  R: [x ] 5, [ ] 4, [ ] 3, [ ] 2, [ ] 1  L: [x ] 5, [ ] 4, [ ] 3, [ ] 2, [ ] 1  Anterior Group  R: [ x] 5, [ ] 4, [ ] 3, [ ] 2, [ ] 1  L: [x ] 5, [ ] 4, [ ] 3, [ ] 2, [ ] 1  Medial Group  R: [x ] 5, [ ] 4, [ ] 3, [ ] 2, [ ] 1  L: [x ] 5, [ ] 4, [ ] 3, [ ] 2, [ ] 1  Lateral Group  R: [x ] 5, [ ] 4, [ ] 3, [ ] 2, [ ] 1  L: [x ] 5, [ ] 4, [ ] 3, [ ] 2, [ ] 1    Gait Examination: Neutral gait & stance  Treatment: Per plan      RADIOLOGY  Resident wet read: Erosive changes noted on the distal aspect of the distal phalanx of the left 2nd toe. May be OM.                      Attending: Dr. Kimmy Townsend DPM    Patient is a 66y old  Male who presents with a chief complaint of infected diabetic ulcer     HPI: 66M PMH HTN, DM, CAD w/ stent,  atrial flutter s/p ablation 2015, afib s/p cardioversion 2020, atrial tachycardia s/p RF ablation Oct 2021, now p/w LE swelling. Pt states that 2-3d after ablation he noticed swelling to b/l LE L>R, as well as bruising to b/l groin region. R has since completely resolved, b/l bruising has completely resolved but still has LLE swelling. Went to cards office 11/19/21 and referred for US which pt states was wnl. Pt states that ~1.5-2w ago he sustained cut to L 2nd toe. Has worsening pain to that toe since then. Has persistent LLE swelling. No other systemic symptoms.   	Denies f/c, SOB, CP, URI symptoms, HA, NVD, abd pain, urinary complaints, lightheaded, fatigue, focal weakness/numbness.     Podiatry was consulted for the management of infected diabetic foot ulcer present on the left 2nd toe (distal tip).     Review of systems negative except per HPI    PAST MEDICAL & SURGICAL HISTORY:  Primary cardiomyopathy  Cardiomyopathy    Type 2 diabetes mellitus  Diabetes    Essential hypertension  Hypertension    Atrial flutter  Atrial flutter    CAD (coronary artery disease)    PVD (peripheral vascular disease)    S/P coronary artery stent placement      Home Medications:  aspirin 81 mg oral tablet: orally once a day (28 Oct 2021 15:55)  Dextrose 5% in Water intravenous solution: 1000 milliliter(s) intravenous  (30 Oct 2021 11:10)  Dextrose 5% in Water intravenous solution: 1000 milliliter(s) intravenous  (30 Oct 2021 11:10)  glipiZIDE:  (28 Oct 2021 15:55)  hydroCHLOROthiazide:  (28 Oct 2021 15:55)  Janumet:  (28 Oct 2021 15:55)  lisinopril:  (28 Oct 2021 15:55)  metFORMIN:  (28 Oct 2021 15:55)  Metoprolol Succinate ER 50 mg oral tablet, extended release: 1 tab(s) orally once a day (28 Oct 2021 15:55)  Norvasc 2.5 mg oral tablet: 1 tab(s) orally once a day (28 Oct 2021 15:55)  omeprazole 40 mg oral delayed release capsule: 1 cap(s) orally once a day (28 Oct 2021 15:55)  sildenafil 100 mg oral tablet: 1 tab(s) orally once a day (28 Oct 2021 15:55)  sotalol 80 mg oral tablet: 0.5 tab(s) orally 2 times a day (28 Oct 2021 15:55)  Xarelto 20 mg oral tablet: 1 tab(s) orally once a day (in the evening) (28 Oct 2021 15:55)    Allergies    No Known Allergies    Intolerances      FAMILY HISTORY:    Social History:       LABS                        11.5   9.26  )-----------( 249      ( 30 Nov 2021 19:43 )             35.7     11-30    137  |  101  |  22  ----------------------------<  240<H>  3.8   |  26  |  1.17    Ca    9.3      30 Nov 2021 19:43    TPro  7.9  /  Alb  4.5  /  TBili  0.4  /  DBili  x   /  AST  14  /  ALT  9<L>  /  AlkPhos  91  11-30    PT/INR - ( 30 Nov 2021 19:43 )   PT: 16.9 sec;   INR: 1.43          PTT - ( 30 Nov 2021 19:43 )  PTT:39.5 sec    Vital Signs Last 24 Hrs  T(C): 36.7 (30 Nov 2021 17:22), Max: 36.7 (30 Nov 2021 17:22)  T(F): 98.1 (30 Nov 2021 17:22), Max: 98.1 (30 Nov 2021 17:22)  HR: 78 (30 Nov 2021 17:22) (78 - 78)  BP: 167/81 (30 Nov 2021 17:22) (167/81 - 167/81)  BP(mean): --  RR: 18 (30 Nov 2021 17:22) (18 - 18)  SpO2: 99% (30 Nov 2021 17:22) (99% - 99%)    PHYSICAL EXAM  General: NAD, AA0x3    Lower Extremity Focused:  Vasc: Palpable pulses b/l, WWP, edematous L foot and leg with erythema present +1 pitting edema   Derm: [place]amo grade 3 ulcer present on the L 2nd toe- distal aspect. Serous drainage noted. +PTB and malodor noted. No purulence noted. Toe is boggy.   Neuro: Protective sensations mildly diminished   MSK: MMSE 5/5 in all compartments     MRN: [id number]  Age: 66y    Hip Internal ROM R: 40   L:   40  Hip External ROM R: 40  L:    40  Reference: Internal (30-45); External (40-50)    Sagittal Knee Position:  Right: [x ] Normal, [ ] Flexed, [ ] Recurvatum  Left: [x ] Normal, [ ] Flexed, [ ] Recurvatum    Frontal Plane Leg:  Right: [x ] Normal, [ ] Varum, [ ] Valgum  Left: [x ] Normal, [ ] Varum, [ ] Valgum    Malleolar Position:  Right: [x ] External, [ ] Internal  Left: [ x] External, [ ] Internal    Limb Length Discrepancy  Right: [ ] Longer, [ ] Carson  _0__ cm  Left: [ ] Longer, [ ] Carson  _0__ cm    Ankle, knee extended  Right: [ x] Normal, [ ] Limited, [ ] Crepitus  Left: [ x] Normal, [ ] Limited, [ ] Crepitus    Ankle, knee flexed  Right: [x ] Normal, [ ] Limited, [ ] Crepitus  Left: [ x] Normal, [ ] Limited, [ ] Crepitus    RCSP  Right: [ ] Vertical, [ ] Varus, [x ] Valgus  Left: [ ] Vertical, [ ] Varus, [ x] Valgus    NCSP:  Right: [ ] Vertical, [ ] Varus, [ x] Valgus  Left: [ ] Vertical, [ ] Varus, [x ] Valgus    STJ ROM:  Right: [x ] Normal, [ ] Limited, [ ] Crepitus  Left: [x ] Normal, [ ] Limited, [ ] Crepitus    MTJ ROM:  Right: [x ] Normal, [ ] Limited, [ ] Crepitus  Left: [x ] Normal, [ ] Limited, [ ] Crepitus    FF to RF Relationship  Right: [x ] Normal, [ ] Varus, [ ] Valgus  Left: [x ] Normal, [ ] Varus, [ ] Valgus    1st Ray Position  Right: [x ] Neutral, [ ] Dorsiflexed, [ ] Plantarflexed, [ ] Hypermobile  Left: [x ] Neutral, [ ] Dorsiflexed, [ ] Plantarflexed, [ ] Hypermobile    1st MTP ROM, loaded  Right: [x ] Normal, [ ] Limited, [ ] Crepitus  Left: [ x] Normal, [ ] Limited, [ ] Crepitus    1st MTP ROM, unloaded  Right: [x ] Normal, [ ] Limited, [ ] Crepitus  Left: [x ] Normal, [ ] Limited, [ ] Crepitus    Muscle Strength  Posterior Group  R: [x ] 5, [ ] 4, [ ] 3, [ ] 2, [ ] 1  L: [x ] 5, [ ] 4, [ ] 3, [ ] 2, [ ] 1  Anterior Group  R: [ x] 5, [ ] 4, [ ] 3, [ ] 2, [ ] 1  L: [x ] 5, [ ] 4, [ ] 3, [ ] 2, [ ] 1  Medial Group  R: [x ] 5, [ ] 4, [ ] 3, [ ] 2, [ ] 1  L: [x ] 5, [ ] 4, [ ] 3, [ ] 2, [ ] 1  Lateral Group  R: [x ] 5, [ ] 4, [ ] 3, [ ] 2, [ ] 1  L: [x ] 5, [ ] 4, [ ] 3, [ ] 2, [ ] 1    Gait Examination: Neutral gait & stance  Treatment: Per plan      RADIOLOGY  Resident wet read: Erosive changes noted on the distal aspect of the distal phalanx of the left 2nd toe. May be OM.

## 2021-11-30 NOTE — H&P ADULT - PROBLEM SELECTOR PLAN 2
Presenting with L lower extremity swelling, which started few days after EP procedure with R + L groin access. US negative for DVT, but with 5cm hematoma. Swelling is most likely 2/2 to hematoma, currently improving. As per EP, who saw patient on 11/19 no interventions needed. Since then - swelling although still present, significantly improved.

## 2021-11-30 NOTE — ED PROVIDER NOTE - PHYSICAL EXAMINATION
minimal residual ecchymosis to L medial/distal thigh region - pt states this is much improved.   LLE: 1+ edema/warmth/erythema from midshin to distal foot. 1+ equal b/l radial pulses. ~1cm ulceration to distal 2nd toe, slight black area at plantar aspect. 2nd toe is slightly purplish w/ more swelling then rest of foot. +foul odor from wound. slightly decreased sensation to distal toes.  No crepitus, firmness, induration, fluctuance.

## 2021-11-30 NOTE — ED PROVIDER NOTE - OBJECTIVE STATEMENT
66M PMH HTN, DM, CAD w/ stent,  atrial flutter s/p ablation 2015, afib s/p cardioversion 2020, atrial tachycardia s/p RF ablation Oct 2021, now p/w LE swelling. Pt states that 2-3d after ablation he noticed swelling to b/l LE L>R, as well as bruising to b/l groin region. R has since completely resolved, b/l bruising has completely resolved but still has LLE swelling. Went to [facility] office 11/19/21 and referred for US which pt states was wnl. Pt states that ~1.5-2w ago he sustained cut to L 2nd toe. Has worsening pain to that toe since then. Has persistent LLE swelling. No other systemic symptoms.   Denies f/c, SOB, CP, URI symptoms, HA, NVD, abd pain, urinary complaints, lightheaded, fatigue, focal weakness/numbness.   Echo 10/29/21:   1. Limited study obtained for evaluation of left ventricular function.   2. Small left ventricular cavity size.   3. Moderate symmetric left ventricular hypertrophy.   4. Hyperdynamic left ventricular systolic function.   5. Normal right ventricular size and systolic function.   6. Dilated left atrium.   7. Aortic sclerosis without significant stenosis.   8. Patient was tachycardic during the study.

## 2021-11-30 NOTE — H&P ADULT - PROBLEM SELECTOR PLAN 1
Presenting with swelling and erythema of 2nd digit of L foot worsening over last 2 weeks  - OM dx supported by + probe to bone and Xray findings   - currently with no systemic symptoms, no concern for sepsis  - as per podiatry will most likely need amputation of distal phalynx. I discussed role of bone biopsy - as per podiatry bone is too small, and deep wound cultures should be sufficient, and patient will need amputation most likely regardless. Biopsy with cultures is NOT planned, therefore will continue with IV antibiotics  - c/w Vancomycin, increase dose to 15mg/kg (1500mg Q12h)   - there is no need for coverage for pseudomonas Presenting with swelling and erythema of 2nd digit of L foot worsening over last 2 weeks  - OM dx supported by + probe to bone and Xray findings   - currently with no systemic symptoms, no concern for sepsis  - as per podiatry will most likely need amputation of distal phalynx. I discussed role of bone biopsy - as per podiatry bone is too small, and deep wound cultures should be sufficient, and patient will need amputation most likely regardless. Biopsy with cultures is NOT planned, therefore will continue with IV antibiotics  - there is no need for coverage for pseudomonas coverage (no foot soaking, no macerated ulcers, no exposure to water or moist environment)  - for gram negative coverage c/w Zosyn (nonpseuodomonal dosing 3.375 q6H)   - c/w Vancomycin, increase dose to 15mg/kg (1500mg Q12h) to cover MRSA  - follow deep wound cultures  - send CRP and ESR Presenting with swelling and erythema of 2nd digit of L foot worsening over last 2 weeks  - OM dx supported by + probe to bone and Xray findings   - currently with no systemic symptoms, no concern for sepsis  - as per podiatry will most likely need amputation of distal phalynx. I discussed role of bone biopsy - as per podiatry bone is too small, and deep wound cultures should be sufficient, and patient will need amputation most likely regardless. Biopsy with cultures is NOT planned, therefore will continue with IV antibiotics  - there is likely no need for coverage for pseudomonas coverage (no foot soaking, no macerated ulcers, no exposure to water or moist environment). However, i/s/o diabetes will do pseudomonas coverage, discussed with attending.   - c/w Zosyn 4.5 Q6H   - c/w Vancomycin, increase dose to 15mg/kg (1500mg Q12h) to cover MRSA  - will need ID consult in AM   - follow deep wound cultures  - send CRP and ESR

## 2021-11-30 NOTE — H&P ADULT - NSHPLABSRESULTS_GEN_ALL_CORE
LABS:                         11.5   9.26  )-----------( 249      ( 30 Nov 2021 19:43 )             35.7     11-30    137  |  101  |  22  ----------------------------<  240<H>  3.8   |  26  |  1.17    Ca    9.3      30 Nov 2021 19:43    TPro  7.9  /  Alb  4.5  /  TBili  0.4  /  DBili  x   /  AST  14  /  ALT  9<L>  /  AlkPhos  91  11-30    PT/INR - ( 30 Nov 2021 19:43 )   PT: 16.9 sec;   INR: 1.43          PTT - ( 30 Nov 2021 19:43 )  PTT:39.5 sec    CARDIAC MARKERS ( 30 Nov 2021 19:43 )  x     / x     / 249 U/L / x     / x            RADIOLOGY, EKG & ADDITIONAL TESTS:     US Doppler [facility] 11/19 (results in Allscript)    All deep and superficial veins of the LE are compressible and patent. No DVT of femoral, politeal, tibioperoneal and saphenous veins. No superficial phelibits. Hematoma noted in the groin 5.0 cm. Extensive edema noted in the calf and ankle.

## 2021-11-30 NOTE — H&P ADULT - PROBLEM SELECTOR PLAN 3
At home on Janumet  bid, and glipizide 10mg daily  - hold oral DM meds  - start insulin sliding scale but likely will need coverage with long acting

## 2021-11-30 NOTE — H&P ADULT - ATTENDING COMMENTS
#OM/Infected diabetic foot ulcer: Presenting with swelling and erythema of 2nd digit of L foot worsening over last 2 weeks- probe to bone w/ xray findings concerning for OM. Pt afebrile, non septic- podiatry prefers amputation over biopsy- c/w IV abx vanc/zosyn for now- f/up wound cultures, a1c; podiatry recs      #LLE edema: present for months; recent LLE dopplers negative for DVT. Known L groin hematoma, stable in size. Follows Dr. Stokes oupt; cosniisabel vascular consult in AM

## 2021-11-30 NOTE — H&P ADULT - NSHPPHYSICALEXAM_GEN_ALL_CORE
Physical Exam:  Gen: Obese middle aged male in NAD  Heart: RRR, no rmg, [id number]  Lungs: CTAB, no signs of increased WOB  LE: L foot freshly changed dressing on. Examined by podiatry, writer saw pictures: "Alamo grade 3 ulcer present on the L 2nd toe- distal aspect. Serous drainage noted. +PTB and malodor noted. No purulence noted. Toe is boggy."  +/++ pitting edema of left lower extremity up to thigh. Mildly elevated temperature when compared to R. Mild erythema throughout.  LE pulses: I was unable to palpate pulses, but easily found with US doppler (dorsalis pedis, posterior tibialis, popliteal)   Abd: soft, nontender, BS+ in 4 Q  Neuro: AAOx3, grossly no deficits

## 2021-11-30 NOTE — CONSULT NOTE ADULT - ASSESSMENT
66M PMH HTN, DM, CAD w/ stent,  atrial flutter s/p ablation 2015, afib s/p cardioversion 2020, atrial tachycardia s/p RF ablation Oct 2021, now p/w Left 2nd digit diabetic infection.     Plan:  - Please include ESR and CRP in AM labs   - Applied betadine to L 2nd toe and dressed with DSD  - F/U left foot xray- final read     Podiatry following; plan d/w Attending  66M PMH HTN, DM, CAD w/ stent,  atrial flutter s/p ablation 2015, afib s/p cardioversion 2020, atrial tachycardia s/p RF ablation Oct 2021, now p/w Left 2nd digit diabetic infection.     Plan:  - Please include ESR and CRP in AM labs   - Applied betadine to L 2nd toe and dressed with DSD  - F/U left foot xray- final read   - F/U deep wound cx of the Left 2nd toe  - Discussed with patient the possibility for left 2nd digit amputation     Podiatry following; plan d/w Attending

## 2021-11-30 NOTE — H&P ADULT - HISTORY OF PRESENT ILLNESS
65 YO M with PMH of HTN, DM, AF (s/p cardioversion, on Xarelto), Atrial Flutter, Atrial tachycardia (s/p RF Ablation in October 2021) presents with swelling, erythema and pain of 2nd digit of L foot. It started about 2 weeks ago, and has been getting progressively worse. No fevers, dizziness, or any systemic symptoms. Patient also reports that he underwent RF ablation done by Dr Nolen one month ago. Few days after the procedure he noticed swelling of bilateral lower extremities associated with diffuse bruising. Swelling of R leg quickly resolved, but L continued. He saw EP on 11/19, US doppler of L LUE was done (results below and also available in allscripts) and showed no DVT, but revealed groin 5.0 cm hematoma, to which swelling (which is currently improving) was attributed to.    To clarify previously charted in card H&P "hx of CAD w/ stents". Patient reports ?coronary angiography few years ago with stent "placed by Dr Nolen ". Patient was never on DAPT. There are no notes in chart from coronarography, and also Dr Nolen is not interventional cardiology, therefore it seems unlikely that patient had stents placed. He denies chest pain currently or ever in the past, and states that he does not have coronary artery disease.      ED:    Vitals: 167/81, Afebrile, HR 78, RR 18, O23 SSat 99% on RA    Labs s/f: Hb 11.5 (13.1 in October), normal total WBC with neutrophils of 7.33 (borderline high). Creatinine 1.17 (11.5 in october).     Imaging:   Xray chest no acute findings  Xray foot (wet read, discussed with radiology resident) destructed bone margin of distal bone of 2nd digit L foot c/w OM)    Course: Given 1g Vancomycin and 3.375 of Zosyn. Given 1L of IVF. Seen by podiatry - + probe to bone test, deep wound cultures done. As per podiatry, likely will need amputation.    SH: Nonsmoker, does not drink, no illicit drugs  Meds: As below, med rec is accurate, he has a list with [facility]   PSH: None  FH: Noncontributory

## 2021-11-30 NOTE — H&P ADULT - PROBLEM SELECTOR PLAN 4
At home on Amlodipine 2.5 mg BID, Lisinopril 20mg QD, HCTZ 12.5 mg QD  - will continue will all above, but will do Amlodipine 5mg QD instead of 2.5 BID

## 2021-11-30 NOTE — PATIENT PROFILE ADULT - FALL HARM RISK - HARM RISK INTERVENTIONS

## 2021-11-30 NOTE — ED ADULT NURSE NOTE - OBJECTIVE STATEMENT
Presents for c/o BLE edema x few weeks, worse s/p ablation with AC at home, notes growing ulcer to L underside of foot. Steady ambulation unassisted, no apparent distress, signficant swelling to BLE with L>R, redness to B feet avila L foot at sole with open wound.

## 2021-11-30 NOTE — ED ADULT NURSE NOTE - CHIEF COMPLAINT QUOTE
Pt co bilateral leg swelling and L foot ulcer. Feels leg swelling has worsened since having a cardiac ablation and starting on AC. Pt says foot ulcer developed 2 weeks ago. Denies drainage from site/ odor. Denies f/c, CP, SOB.

## 2021-11-30 NOTE — H&P ADULT - PROBLEM SELECTOR PLAN 5
Currently in NSR. S/P Cardioversion, and RF ablation in October 2021.  - at home on Xarelto, last dose on 11/29 in PM  - will switch to Lovenox 100mg BID anticipating possible amputation  - c/w MTP XL 50mg QD  - c/w Sotalol 40mg BID (home med, as per EP) Currently in NSR. S/P Cardioversion, and RF ablation in October 2021.  - at home on Xarelto, last dose on 11/29 in PM  - will switch to Lovenox 100mg BID anticipating possible amputation (he is not planned for amputation tomorrow), likely on Thursday as per podiatry  - c/w MTP XL 50mg QD  - c/w Sotalol 40mg BID (home med, as per EP)

## 2021-11-30 NOTE — H&P ADULT - ASSESSMENT
67 YO M with PMH of HTN, DM, AF (s/p cardioversion, on Xarelto), Atrial Flutter, Atrial tachycardia (s/p RF Ablation in October 2021) presents with swelling, erythema and pain of 2nd digit of L foot 2/2 Osteomyelitis i/s/o + probe to bone test and Xray findings.

## 2021-11-30 NOTE — H&P ADULT - PROBLEM SELECTOR PLAN 6
DVT ppx: Lovenox therapeutic for AF  Diet: DASH, carbs consistent, NPO after midnight in case he goes for amputation tomorrow   Code: FULL  Fluids: None    He is on Aspirin 81mg QD, it seems that he does not have history of CAD, so possibly primary CAD prophylaxis? will continue for now . DVT ppx: Lovenox therapeutic for AF  Diet: DASH, carbs consistent  Code: FULL  Fluids: None    He is on Aspirin 81mg QD, it seems that he does not have history of CAD, so possibly primary CAD prophylaxis? will continue for now . DVT ppx: Lovenox therapeutic for AF  Diet: DASH, carbs consistent  Code: FULL  Fluids: None    He is on Aspirin 81mg QD, it seems that he does not have history of CAD, so possibly primary CAD prophylaxis? Never on DAPT, had ablation in 2015 and DVT ppx: Lovenox therapeutic for AF  Diet: DASH, carbs consistent  Code: FULL  Fluids: None    He is on Aspirin 81mg QD, it seems that he does not have history of CAD, so possibly primary CAD prophylaxis? Never on DAPT, had ablation in 2015 and likely this is what he is referring to when he mentions "stent in heart". EP notes do not mention anything about CAD or stents. Will hold aspirin given hematoma and possible procedure.

## 2021-12-01 ENCOUNTER — APPOINTMENT (OUTPATIENT)
Dept: HEART AND VASCULAR | Facility: CLINIC | Age: 66
End: 2021-12-01

## 2021-12-01 ENCOUNTER — RESULT REVIEW (OUTPATIENT)
Age: 66
End: 2021-12-01

## 2021-12-01 LAB
A1C WITH ESTIMATED AVERAGE GLUCOSE RESULT: 7.8 % — HIGH (ref 4–5.6)
ANION GAP SERPL CALC-SCNC: 10 MMOL/L — SIGNIFICANT CHANGE UP (ref 5–17)
APTT BLD: 32.4 SEC — SIGNIFICANT CHANGE UP (ref 27.5–35.5)
BLD GP AB SCN SERPL QL: NEGATIVE — SIGNIFICANT CHANGE UP
BLD GP AB SCN SERPL QL: NEGATIVE — SIGNIFICANT CHANGE UP
BUN SERPL-MCNC: 15 MG/DL — SIGNIFICANT CHANGE UP (ref 7–23)
CALCIUM SERPL-MCNC: 8.6 MG/DL — SIGNIFICANT CHANGE UP (ref 8.4–10.5)
CHLORIDE SERPL-SCNC: 103 MMOL/L — SIGNIFICANT CHANGE UP (ref 96–108)
CO2 SERPL-SCNC: 23 MMOL/L — SIGNIFICANT CHANGE UP (ref 22–31)
CREAT SERPL-MCNC: 1 MG/DL — SIGNIFICANT CHANGE UP (ref 0.5–1.3)
ESTIMATED AVERAGE GLUCOSE: 177 MG/DL — HIGH (ref 68–114)
GLUCOSE BLDC GLUCOMTR-MCNC: 128 MG/DL — HIGH (ref 70–99)
GLUCOSE BLDC GLUCOMTR-MCNC: 129 MG/DL — HIGH (ref 70–99)
GLUCOSE BLDC GLUCOMTR-MCNC: 132 MG/DL — HIGH (ref 70–99)
GLUCOSE BLDC GLUCOMTR-MCNC: 233 MG/DL — HIGH (ref 70–99)
GLUCOSE SERPL-MCNC: 138 MG/DL — HIGH (ref 70–99)
GRAM STN FLD: SIGNIFICANT CHANGE UP
HCT VFR BLD CALC: 33.4 % — LOW (ref 39–50)
HGB BLD-MCNC: 10.5 G/DL — LOW (ref 13–17)
INR BLD: 1.41 — HIGH (ref 0.88–1.16)
MCHC RBC-ENTMCNC: 28.2 PG — SIGNIFICANT CHANGE UP (ref 27–34)
MCHC RBC-ENTMCNC: 31.4 GM/DL — LOW (ref 32–36)
MCV RBC AUTO: 89.5 FL — SIGNIFICANT CHANGE UP (ref 80–100)
NRBC # BLD: 0 /100 WBCS — SIGNIFICANT CHANGE UP (ref 0–0)
PLATELET # BLD AUTO: 223 K/UL — SIGNIFICANT CHANGE UP (ref 150–400)
POTASSIUM SERPL-MCNC: 3.7 MMOL/L — SIGNIFICANT CHANGE UP (ref 3.5–5.3)
POTASSIUM SERPL-SCNC: 3.7 MMOL/L — SIGNIFICANT CHANGE UP (ref 3.5–5.3)
PROTHROM AB SERPL-ACNC: 16.7 SEC — HIGH (ref 10.6–13.6)
RBC # BLD: 3.73 M/UL — LOW (ref 4.2–5.8)
RBC # FLD: 12.7 % — SIGNIFICANT CHANGE UP (ref 10.3–14.5)
RH IG SCN BLD-IMP: POSITIVE — SIGNIFICANT CHANGE UP
RH IG SCN BLD-IMP: POSITIVE — SIGNIFICANT CHANGE UP
SODIUM SERPL-SCNC: 136 MMOL/L — SIGNIFICANT CHANGE UP (ref 135–145)
SPECIMEN SOURCE: SIGNIFICANT CHANGE UP
WBC # BLD: 8.5 K/UL — SIGNIFICANT CHANGE UP (ref 3.8–10.5)
WBC # FLD AUTO: 8.5 K/UL — SIGNIFICANT CHANGE UP (ref 3.8–10.5)

## 2021-12-01 PROCEDURE — 99233 SBSQ HOSP IP/OBS HIGH 50: CPT | Mod: GC

## 2021-12-01 PROCEDURE — 88304 TISSUE EXAM BY PATHOLOGIST: CPT | Mod: 26

## 2021-12-01 RX ORDER — POTASSIUM CHLORIDE 20 MEQ
10 PACKET (EA) ORAL DAILY
Refills: 0 | Status: DISCONTINUED | OUTPATIENT
Start: 2021-12-01 | End: 2021-12-01

## 2021-12-01 RX ORDER — LISINOPRIL 2.5 MG/1
40 TABLET ORAL DAILY
Refills: 0 | Status: DISCONTINUED | OUTPATIENT
Start: 2021-12-02 | End: 2021-12-02

## 2021-12-01 RX ORDER — PANTOPRAZOLE SODIUM 20 MG/1
40 TABLET, DELAYED RELEASE ORAL
Refills: 0 | Status: DISCONTINUED | OUTPATIENT
Start: 2021-12-01 | End: 2021-12-02

## 2021-12-01 RX ORDER — SOTALOL HCL 120 MG
40 TABLET ORAL
Refills: 0 | Status: DISCONTINUED | OUTPATIENT
Start: 2021-12-02 | End: 2021-12-02

## 2021-12-01 RX ORDER — ACETAMINOPHEN 500 MG
650 TABLET ORAL EVERY 6 HOURS
Refills: 0 | Status: DISCONTINUED | OUTPATIENT
Start: 2021-12-01 | End: 2021-12-02

## 2021-12-01 RX ORDER — POTASSIUM CHLORIDE 20 MEQ
20 PACKET (EA) ORAL ONCE
Refills: 0 | Status: COMPLETED | OUTPATIENT
Start: 2021-12-01 | End: 2021-12-01

## 2021-12-01 RX ORDER — LISINOPRIL 2.5 MG/1
40 TABLET ORAL DAILY
Refills: 0 | Status: DISCONTINUED | OUTPATIENT
Start: 2021-12-01 | End: 2021-12-01

## 2021-12-01 RX ORDER — OXYCODONE HYDROCHLORIDE 5 MG/1
5 TABLET ORAL EVERY 6 HOURS
Refills: 0 | Status: DISCONTINUED | OUTPATIENT
Start: 2021-12-01 | End: 2021-12-02

## 2021-12-01 RX ORDER — HYDROMORPHONE HYDROCHLORIDE 2 MG/ML
1 INJECTION INTRAMUSCULAR; INTRAVENOUS; SUBCUTANEOUS
Refills: 0 | Status: DISCONTINUED | OUTPATIENT
Start: 2021-12-01 | End: 2021-12-02

## 2021-12-01 RX ORDER — HYDROMORPHONE HYDROCHLORIDE 2 MG/ML
0.5 INJECTION INTRAMUSCULAR; INTRAVENOUS; SUBCUTANEOUS
Refills: 0 | Status: DISCONTINUED | OUTPATIENT
Start: 2021-12-01 | End: 2021-12-02

## 2021-12-01 RX ADMIN — Medication 2: at 12:17

## 2021-12-01 RX ADMIN — PIPERACILLIN AND TAZOBACTAM 200 GRAM(S): 4; .5 INJECTION, POWDER, LYOPHILIZED, FOR SOLUTION INTRAVENOUS at 06:31

## 2021-12-01 RX ADMIN — AMLODIPINE BESYLATE 5 MILLIGRAM(S): 2.5 TABLET ORAL at 06:36

## 2021-12-01 RX ADMIN — PIPERACILLIN AND TAZOBACTAM 200 GRAM(S): 4; .5 INJECTION, POWDER, LYOPHILIZED, FOR SOLUTION INTRAVENOUS at 12:13

## 2021-12-01 RX ADMIN — Medication 50 MILLIGRAM(S): at 06:32

## 2021-12-01 RX ADMIN — Medication 300 MILLIGRAM(S): at 08:52

## 2021-12-01 RX ADMIN — LISINOPRIL 20 MILLIGRAM(S): 2.5 TABLET ORAL at 06:36

## 2021-12-01 RX ADMIN — Medication 20 MILLIEQUIVALENT(S): at 09:36

## 2021-12-01 RX ADMIN — PIPERACILLIN AND TAZOBACTAM 200 GRAM(S): 4; .5 INJECTION, POWDER, LYOPHILIZED, FOR SOLUTION INTRAVENOUS at 21:03

## 2021-12-01 RX ADMIN — PIPERACILLIN AND TAZOBACTAM 200 GRAM(S): 4; .5 INJECTION, POWDER, LYOPHILIZED, FOR SOLUTION INTRAVENOUS at 00:59

## 2021-12-01 RX ADMIN — Medication 12.5 MILLIGRAM(S): at 06:32

## 2021-12-01 NOTE — PROGRESS NOTE ADULT - ASSESSMENT
66M PMH HTN, DM, CAD w/ stent,  atrial flutter s/p ablation 2015, afib s/p cardioversion 2020, atrial tachycardia s/p RF ablation Oct 2021, now p/w Left 2nd digit diabetic infection. Of note CRP 63.3, ESR 70. Of note, XR foot impression: "osteopenia and suggestion of fragmentation of the distal phalanx of the second digit worrisome for osteomyelitis". Pt is now s/p left 2nd distal digit amputation with primary closure.     Plan:  - Dressed with betadine soaked adaptic, DSD, kerlix wrap  - F/U deep wound cx of the Left 2nd toe  - C/w empiric ABX; pending susceptibilities of Cx  - Pt can ambulate as tolerated on LLE with surgical shoe  - Pt is stable from podiatric standpoint and can follow up outpt w/ Dr. Alyssia Dow DPM for continued care within 1-2 weeks upon dispo to home.     Plan d/w Attending     Follow-up at [facility] within 1-2 weeks of discharge.   Address: [facility] [address] [place] [address] (between [address] [facility], [place] [address].   Phone: [phone]; [phone]  Please call for an appointment.

## 2021-12-01 NOTE — PROGRESS NOTE ADULT - SUBJECTIVE AND OBJECTIVE BOX
OVERNIGHT EVENTS: NAEO    SUBJECTIVE:  Patient seen and examined at bedside. Denies any complaints. Endorses b/l leg swelling.    Vital Signs Last 12 Hrs  T(F): 99 (12-01-21 @ 05:35), Max: 99 (12-01-21 @ 05:35)  HR: 72 (12-01-21 @ 05:35) (72 - 72)  BP: 123/56 (12-01-21 @ 05:35) (123/56 - 123/56)  BP(mean): --  RR: 17 (12-01-21 @ 05:35) (17 - 17)  SpO2: 99% (12-01-21 @ 05:35) (99% - 99%)  I&O's Summary      Physical Exam:  Gen: Obese middle aged male in NAD  Heart: RRR, no rmg, [id number]  Lungs: CTAB, no signs of increased WOB  LE: L foot freshly changed dressing on. Examined by podiatry, writer saw pictures: "Alamo grade 3 ulcer present on the L 2nd toe- distal aspect. Serous drainage noted. +PTB and malodor noted. No purulence noted. Toe is boggy."  +/++ pitting edema of left lower extremity up to thigh. Mildly elevated temperature when compared to R. Mild erythema throughout.  LE pulses: unable to palpate pulses, but previously found with US doppler (dorsalis pedis, posterior tibialis, popliteal), smal L groin hematoma that is stable  Abd: soft, nontender, BS+ in 4 Q  Neuro: AAOx3, grossly no deficits        LABS:                        10.5   8.50  )-----------( 223      ( 01 Dec 2021 07:45 )             33.4     12-01    136  |  103  |  15  ----------------------------<  138<H>  3.7   |  23  |  1.00    Ca    8.6      01 Dec 2021 07:45    TPro  7.9  /  Alb  4.5  /  TBili  0.4  /  DBili  x   /  AST  14  /  ALT  9<L>  /  AlkPhos  91  11-30    PT/INR - ( 30 Nov 2021 19:43 )   PT: 16.9 sec;   INR: 1.43          PTT - ( 30 Nov 2021 19:43 )  PTT:39.5 sec        RADIOLOGY & ADDITIONAL TESTS:    MEDICATIONS  (STANDING):  dextrose 40% Gel 15 Gram(s) Oral once  dextrose 5%. 1000 milliLiter(s) (50 mL/Hr) IV Continuous <Continuous>  dextrose 5%. 1000 milliLiter(s) (100 mL/Hr) IV Continuous <Continuous>  dextrose 50% Injectable 25 Gram(s) IV Push once  dextrose 50% Injectable 12.5 Gram(s) IV Push once  dextrose 50% Injectable 25 Gram(s) IV Push once  glucagon  Injectable 1 milliGRAM(s) IntraMuscular once  hydrochlorothiazide 12.5 milliGRAM(s) Oral daily  insulin lispro (ADMELOG) corrective regimen sliding scale   SubCutaneous three times a day before meals  metoprolol succinate ER 50 milliGRAM(s) Oral daily  piperacillin/tazobactam IVPB.. 4.5 Gram(s) IV Intermittent every 6 hours  potassium chloride    Tablet ER 10 milliEquivalent(s) Oral daily  vancomycin  IVPB 1500 milliGRAM(s) IV Intermittent every 12 hours    MEDICATIONS  (PRN):

## 2021-12-01 NOTE — PROGRESS NOTE ADULT - PROBLEM SELECTOR PLAN 3
At home on Amlodipine 2.5 mg BID, Lisinopril 20mg QD, HCTZ 12.5 mg QD    Plan  - Will d/c amlodipine  - inc lisinopril to 40mg Qd (hold for SBP< 100)  - c/w hctz 12.5 Qd  - clarify HTN medications with PCP as pt also on metoprolol XL and sotalol At home on Amlodipine 2.5 mg BID, Lisinopril 20mg QD, HCTZ 12.5 mg QD    Plan  - Will d/c amlodipine  - inc lisinopril to 40mg Qd (hold for SBP< 100)  - c/w hctz 12.5 Qd

## 2021-12-01 NOTE — PROGRESS NOTE ADULT - SUBJECTIVE AND OBJECTIVE BOX
POST OP NOTE    MCMORRIES, DARYLE  MRN-[id number]    Procedure: left distal 2nd digit amputation w/ primary closure  Surgeon: Dr. Townsend, DPM  Assistants: Ousmane Shultz, HERMELINDO    Patient tolerated procedure well without incident.  Patient transferred to PACU in stable condition. Pt evaluated at bedside in PACU, he is in NAD at this time. Pt denies N/V/F/SOB/CP at this time. He denies pain at surgical site. No pedal complaints     PE / Post Op Check:       GEN: NAD, AAOx3, resting comfortably with pain controlled      LE Focused: CFT shows adequate perfusion b/l with no signs of ischemic compromise.  No strikethrough is appreciated on surgical dressings.  Dressings were dry, clean, and intact.  No neuromuscular deficits appreciated.

## 2021-12-01 NOTE — PROGRESS NOTE ADULT - ASSESSMENT
65 YO M with PMH of HTN, DM, AF (s/p cardioversion, on Xarelto), Atrial Flutter, Atrial tachycardia (s/p RF Ablation in October 2021) presents with swelling, erythema and pain of 2nd digit of L foot 2/2 Osteomyelitis i/s/o + probe to bone test and Xray findings.

## 2021-12-01 NOTE — PROGRESS NOTE ADULT - PROBLEM SELECTOR PLAN 5
Presenting with L lower extremity swelling, which started few days after EP procedure with R + L groin access. US negative for DVT, but with 5cm hematoma. Swelling is most likely 2/2 to hematoma, currently improving. As per EP, who saw patient on 11/19 no interventions needed. Since then - swelling although still present, significantly improved.    Plan  - continue to monitor

## 2021-12-01 NOTE — PROGRESS NOTE ADULT - SUBJECTIVE AND OBJECTIVE BOX
Patient is a 66y old  Male who presents with a chief complaint of Osteomyelitis (30 Nov 2021 22:14)      INTERVAL HPI/ OVERNIGHT EVENTS: Pt evaluated at bedside. RACHEL o/n. Pt comfortable in bed awaiting surgery.       LABS                        11.5   9.26  )-----------( 249      ( 30 Nov 2021 19:43 )             35.7     11-30    137  |  101  |  22  ----------------------------<  240<H>  3.8   |  26  |  1.17    Ca    9.3      30 Nov 2021 19:43    TPro  7.9  /  Alb  4.5  /  TBili  0.4  /  DBili  x   /  AST  14  /  ALT  9<L>  /  AlkPhos  91  11-30    PT/INR - ( 30 Nov 2021 19:43 )   PT: 16.9 sec;   INR: 1.43          PTT - ( 30 Nov 2021 19:43 )  PTT:39.5 sec  ESR: 70  CRP: --  11-30 @ 19:43    ICU Vital Signs Last 24 Hrs  T(C): 37.2 (01 Dec 2021 05:35), Max: 37.2 (01 Dec 2021 05:35)  T(F): 99 (01 Dec 2021 05:35), Max: 99 (01 Dec 2021 05:35)  HR: 72 (01 Dec 2021 05:35) (63 - 78)  BP: 123/56 (01 Dec 2021 05:35) (123/56 - 167/81)  BP(mean): --  ABP: --  ABP(mean): --  RR: 17 (01 Dec 2021 05:35) (16 - 18)  SpO2: 99% (01 Dec 2021 05:35) (99% - 100%)      RADIOLOGY    MICROBIOLOGY    PHYSICAL EXAM  Lower Extremity Focused:  Vasc: Palpable pulses b/l, WWP, edematous L foot and leg with erythema present +1 pitting edema   Derm: Alamo grade 3 ulcer present on the L 2nd toe- distal aspect. Serous drainage noted. +PTB and malodor noted. No purulence noted. Toe is boggy.   Neuro: Protective sensations mildly diminished   MSK: MMSE 5/5 in all compartments Patient is a 66y old  Male who presents with a chief complaint of Osteomyelitis (30 Nov 2021 22:14)      INTERVAL HPI/ OVERNIGHT EVENTS: Pt evaluated at bedside. RACHEL o/n. Pt comfortable in bed awaiting surgery.       LABS                        11.5   9.26  )-----------( 249      ( 30 Nov 2021 19:43 )             35.7     11-30    137  |  101  |  22  ----------------------------<  240<H>  3.8   |  26  |  1.17    Ca    9.3      30 Nov 2021 19:43    TPro  7.9  /  Alb  4.5  /  TBili  0.4  /  DBili  x   /  AST  14  /  ALT  9<L>  /  AlkPhos  91  11-30    PT/INR - ( 30 Nov 2021 19:43 )   PT: 16.9 sec;   INR: 1.43          PTT - ( 30 Nov 2021 19:43 )  PTT:39.5 sec  ESR: 70  CRP: --  11-30 @ 19:43    ICU Vital Signs Last 24 Hrs  T(C): 37.2 (01 Dec 2021 05:35), Max: 37.2 (01 Dec 2021 05:35)  T(F): 99 (01 Dec 2021 05:35), Max: 99 (01 Dec 2021 05:35)  HR: 72 (01 Dec 2021 05:35) (63 - 78)  BP: 123/56 (01 Dec 2021 05:35) (123/56 - 167/81)  BP(mean): --  ABP: --  ABP(mean): --  RR: 17 (01 Dec 2021 05:35) (16 - 18)  SpO2: 99% (01 Dec 2021 05:35) (99% - 100%)      RADIOLOGY    MICROBIOLOGY    PHYSICAL EXAM  Lower Extremity Focused:  Vasc: Palpable pulses b/l, WWP, edematous L foot and leg with erythema present +1 pitting edema   Derm: Alamo grade 3 ulcer present on the L 2nd toe- distal aspect. Serous drainage noted. +PTB and malodor noted. No purulence noted. Toe is boggy.   Neuro: Protective sensations mildly diminished   MSK: MMSE 5/5 in all compartments    MRN: [id number]  Age: 66y    Hip Internal ROM R: 40   L:   40  Hip External ROM R: 40  L:    40  Reference: Internal (30-45); External (40-50)    Sagittal Knee Position:  Right: [x ] Normal, [ ] Flexed, [ ] Recurvatum  Left: [x ] Normal, [ ] Flexed, [ ] Recurvatum    Frontal Plane Leg:  Right: [x ] Normal, [ ] Varum, [ ] Valgum  Left: [x ] Normal, [ ] Varum, [ ] Valgum    Malleolar Position:  Right: [x ] External, [ ] Internal  Left: [ x] External, [ ] Internal    Limb Length Discrepancy  Right: [ ] Longer, [ ] [place]  _0__ cm  Left: [ ] Longer, [ ] [place]  _0__ cm    Ankle, knee extended  Right: [ x] Normal, [ ] Limited, [ ] Crepitus  Left: [ x] Normal, [ ] Limited, [ ] Crepitus    Ankle, knee flexed  Right: [x ] Normal, [ ] Limited, [ ] Crepitus  Left: [ x] Normal, [ ] Limited, [ ] Crepitus    RCSP  Right: [ ] Vertical, [ ] Varus, [x ] Valgus  Left: [ ] Vertical, [ ] Varus, [ x] Valgus    NCSP:  Right: [ ] Vertical, [ ] Varus, [ x] Valgus  Left: [ ] Vertical, [ ] Varus, [x ] Valgus    STJ ROM:  Right: [x ] Normal, [ ] Limited, [ ] Crepitus  Left: [x ] Normal, [ ] Limited, [ ] Crepitus    MTJ ROM:  Right: [x ] Normal, [ ] Limited, [ ] Crepitus  Left: [x ] Normal, [ ] Limited, [ ] Crepitus    FF to RF Relationship  Right: [x ] Normal, [ ] Varus, [ ] Valgus  Left: [x ] Normal, [ ] Varus, [ ] Valgus    1st Ray Position  Right: [x ] Neutral, [ ] Dorsiflexed, [ ] Plantarflexed, [ ] Hypermobile  Left: [x ] Neutral, [ ] Dorsiflexed, [ ] Plantarflexed, [ ] Hypermobile    1st MTP ROM, loaded  Right: [x ] Normal, [ ] Limited, [ ] Crepitus  Left: [ x] Normal, [ ] Limited, [ ] Crepitus    1st MTP ROM, unloaded  Right: [x ] Normal, [ ] Limited, [ ] Crepitus  Left: [x ] Normal, [ ] Limited, [ ] Crepitus    Muscle Strength  Posterior Group  R: [x ] 5, [ ] 4, [ ] 3, [ ] 2, [ ] 1  L: [x ] 5, [ ] 4, [ ] 3, [ ] 2, [ ] 1  Anterior Group  R: [ x] 5, [ ] 4, [ ] 3, [ ] 2, [ ] 1  L: [x ] 5, [ ] 4, [ ] 3, [ ] 2, [ ] 1  Medial Group  R: [x ] 5, [ ] 4, [ ] 3, [ ] 2, [ ] 1  L: [x ] 5, [ ] 4, [ ] 3, [ ] 2, [ ] 1  Lateral Group  R: [x ] 5, [ ] 4, [ ] 3, [ ] 2, [ ] 1  L: [x ] 5, [ ] 4, [ ] 3, [ ] 2, [ ] 1    Gait Examination: Neutral gait & stance  Treatment: Per plan

## 2021-12-01 NOTE — PROGRESS NOTE ADULT - ATTENDING COMMENTS
agree with assessment and plan as documented by resident.   --plan for OR today with podiatry team for toe amputation  --continue IV vanc/zosyn; f/u wound cx rec  --will f/u podiatry team recs after amputation for further assessment if imaging or IV abx course needed  --will stop home norvasc - can continue other htn agents; med rec for 2 beta blockers (continue metoprolol for now; holding sotalol) agree with assessment and plan as documented by resident.   --plan for OR today with podiatry team for toe amputation  --continue IV vanc/zosyn; f/u wound cx rec  --will f/u podiatry team recs after amputation for further assessment if imaging or IV abx course needed  --will stop home norvasc - can continue other htn agents; med rec for 2 beta blockers (sotalol continued)

## 2021-12-01 NOTE — PACU DISCHARGE NOTE - COMMENTS
Patient is hemodynamically stable and reports pain managed.  Meets PACU discharge criteria.  Report given to unit RN.  Patient transported via stretcher to unit.  Accompanied by [email or web address]

## 2021-12-01 NOTE — PROGRESS NOTE ADULT - PROBLEM SELECTOR PLAN 2
At home on Janumet  bid, and glipizide 10mg daily  - hold oral DM meds  - HbA1c 7.8  - start insulin sliding scale but likely will need coverage with long acting pending diet resumption  - Restart diet and monitor needs following procedure

## 2021-12-01 NOTE — PROGRESS NOTE ADULT - PROBLEM SELECTOR PLAN 1
Presenting with swelling and erythema of 2nd digit of L foot worsening over last 2 weeks  - OM dx supported by + probe to bone, xray findings, wound culture + gram positive cocci in pairs, ESRm CRP elevated.  - currently with no systemic symptoms, no concern for sepsis    Plan  - NPO for amputation in PM with podiatry  - c/w Zosyn 4.5 Q6H i/s/o diabetes  - c/w Vancomycin, increase dose to 15mg/kg (1500mg Q12h) to cover MRSA  - F/U vanc trough 12/2 AM  - F/U ID reccs about antibiotic regimen following amputation

## 2021-12-01 NOTE — PROGRESS NOTE ADULT - PROBLEM SELECTOR PLAN 4
Currently in NSR. S/P Cardioversion, and RF ablation in October 2021.  - at home on Xarelto, last dose on 11/29 in PM, [id number] while IP  - Hold lovenox for procedure this PM  - c/w MTP XL 50mg QD  - holding home Sotalol 40mg BID (home med, as per EP) pending EP clarification Currently in NSR. S/P Cardioversion, and RF ablation in October 2021.  - at home on Xarelto, last dose on 11/29 in PM, [id number] while IP  - Hold lovenox for procedure this PM  - c/w MTP XL 50mg QD  - confirmed pt is taking Sotalol 40mg BID (home med, as per EP), will resume Currently in NSR. S/P Cardioversion, and RF ablation in October 2021.  - at home on Xarelto, last dose on 11/29 in PM, [id number] while IP  - Hold lovenox for procedure this PM  - d/c'd MTP 50XL as pt no longer taking per PCP  - confirmed pt is taking Sotalol 40mg BID (home med, as per EP), will resume Currently in NSR. S/P Cardioversion, and RF ablation in October 2021.  - at home on Xarelto, last dose on 11/29 in PM, [id number] while IP  - Hold lovenox for procedure this PM  - d/c'd MTP 50XL as pt no longer taking per PCP  - confirmed pt is taking Sotalol 40mg BID (home med, as per EP), will resume  - Can restart xarelto tomorrow

## 2021-12-01 NOTE — BRIEF OPERATIVE NOTE - OPERATION/FINDINGS
Distal aspect of left 2nd digit was resected followed by wash out. Dirty and clean margins sent for culture and pathology. Primary closure performed using 3-0 Nylon. Fissuring along plantar aspect of 2nd digit appreciated during closure due to friability of patient skin. Dressed with betadine soaked adaptic, betadine soaked gauze, ABD, and Kerlix wrap.

## 2021-12-01 NOTE — PROGRESS NOTE ADULT - ASSESSMENT
66M PMH HTN, DM, CAD w/ stent,  atrial flutter s/p ablation 2015, afib s/p cardioversion 2020, atrial tachycardia s/p RF ablation Oct 2021, now p/w Left 2nd digit diabetic infection.     Plan:  - F/u ESR, CRP  - Applied betadine to L 2nd toe and dressed with DSD  - F/U left foot xray- final read   - F/U deep wound cx of the Left 2nd toe  - Discussed with patient the possibility for left 2nd digit amputation, pt amenable to amputation, case will be added on for evening today; NPO @ 9AM.     Podiatry following; plan d/w Attending  66M PMH HTN, DM, CAD w/ stent,  atrial flutter s/p ablation 2015, afib s/p cardioversion 2020, atrial tachycardia s/p RF ablation Oct 2021, now p/w Left 2nd digit diabetic infection. Of note CRP 63.3, ESR 70.     Plan:  - Applied betadine to L 2nd toe and dressed with DSD  - F/U left foot xray- final read   - F/U deep wound cx of the Left 2nd toe  - Discussed with patient the possibility for left 2nd digit amputation, pt amenable to amputation, case will be added on for evening today; NPO @ 9AM.     Podiatry following; plan d/w Attending

## 2021-12-02 ENCOUNTER — TRANSCRIPTION ENCOUNTER (OUTPATIENT)
Age: 66
End: 2021-12-02

## 2021-12-02 VITALS
OXYGEN SATURATION: 100 % | SYSTOLIC BLOOD PRESSURE: 123 MMHG | RESPIRATION RATE: 18 BRPM | TEMPERATURE: 99 F | DIASTOLIC BLOOD PRESSURE: 72 MMHG | HEART RATE: 67 BPM

## 2021-12-02 LAB
-  AMPICILLIN/SULBACTAM: SIGNIFICANT CHANGE UP
-  AMPICILLIN: SIGNIFICANT CHANGE UP
-  CEFAZOLIN: SIGNIFICANT CHANGE UP
-  CEFTRIAXONE: SIGNIFICANT CHANGE UP
-  CIPROFLOXACIN: SIGNIFICANT CHANGE UP
-  CLINDAMYCIN: SIGNIFICANT CHANGE UP
-  ERTAPENEM: SIGNIFICANT CHANGE UP
-  ERYTHROMYCIN: SIGNIFICANT CHANGE UP
-  GENTAMICIN: SIGNIFICANT CHANGE UP
-  LEVOFLOXACIN: SIGNIFICANT CHANGE UP
-  PENICILLIN: SIGNIFICANT CHANGE UP
-  PIPERACILLIN/TAZOBACTAM: SIGNIFICANT CHANGE UP
-  TOBRAMYCIN: SIGNIFICANT CHANGE UP
-  TRIMETHOPRIM/SULFAMETHOXAZOLE: SIGNIFICANT CHANGE UP
-  VANCOMYCIN: SIGNIFICANT CHANGE UP
ANION GAP SERPL CALC-SCNC: 8 MMOL/L — SIGNIFICANT CHANGE UP (ref 5–17)
BASOPHILS # BLD AUTO: 0.02 K/UL — SIGNIFICANT CHANGE UP (ref 0–0.2)
BASOPHILS NFR BLD AUTO: 0.3 % — SIGNIFICANT CHANGE UP (ref 0–2)
BUN SERPL-MCNC: 14 MG/DL — SIGNIFICANT CHANGE UP (ref 7–23)
CALCIUM SERPL-MCNC: 8.6 MG/DL — SIGNIFICANT CHANGE UP (ref 8.4–10.5)
CHLORIDE SERPL-SCNC: 102 MMOL/L — SIGNIFICANT CHANGE UP (ref 96–108)
CO2 SERPL-SCNC: 25 MMOL/L — SIGNIFICANT CHANGE UP (ref 22–31)
CREAT SERPL-MCNC: 1.19 MG/DL — SIGNIFICANT CHANGE UP (ref 0.5–1.3)
EOSINOPHIL # BLD AUTO: 0.18 K/UL — SIGNIFICANT CHANGE UP (ref 0–0.5)
EOSINOPHIL NFR BLD AUTO: 2.8 % — SIGNIFICANT CHANGE UP (ref 0–6)
GLUCOSE BLDC GLUCOMTR-MCNC: 207 MG/DL — HIGH (ref 70–99)
GLUCOSE SERPL-MCNC: 200 MG/DL — HIGH (ref 70–99)
HCT VFR BLD CALC: 33.2 % — LOW (ref 39–50)
HGB BLD-MCNC: 10.9 G/DL — LOW (ref 13–17)
IMM GRANULOCYTES NFR BLD AUTO: 0.5 % — SIGNIFICANT CHANGE UP (ref 0–1.5)
LYMPHOCYTES # BLD AUTO: 0.81 K/UL — LOW (ref 1–3.3)
LYMPHOCYTES # BLD AUTO: 12.5 % — LOW (ref 13–44)
MAGNESIUM SERPL-MCNC: 1.9 MG/DL — SIGNIFICANT CHANGE UP (ref 1.6–2.6)
MCHC RBC-ENTMCNC: 29.1 PG — SIGNIFICANT CHANGE UP (ref 27–34)
MCHC RBC-ENTMCNC: 32.8 GM/DL — SIGNIFICANT CHANGE UP (ref 32–36)
MCV RBC AUTO: 88.8 FL — SIGNIFICANT CHANGE UP (ref 80–100)
METHOD TYPE: SIGNIFICANT CHANGE UP
METHOD TYPE: SIGNIFICANT CHANGE UP
MONOCYTES # BLD AUTO: 0.69 K/UL — SIGNIFICANT CHANGE UP (ref 0–0.9)
MONOCYTES NFR BLD AUTO: 10.7 % — SIGNIFICANT CHANGE UP (ref 2–14)
NEUTROPHILS # BLD AUTO: 4.74 K/UL — SIGNIFICANT CHANGE UP (ref 1.8–7.4)
NEUTROPHILS NFR BLD AUTO: 73.2 % — SIGNIFICANT CHANGE UP (ref 43–77)
NRBC # BLD: 0 /100 WBCS — SIGNIFICANT CHANGE UP (ref 0–0)
PHOSPHATE SERPL-MCNC: 3 MG/DL — SIGNIFICANT CHANGE UP (ref 2.5–4.5)
PLATELET # BLD AUTO: 228 K/UL — SIGNIFICANT CHANGE UP (ref 150–400)
POTASSIUM SERPL-MCNC: 4.2 MMOL/L — SIGNIFICANT CHANGE UP (ref 3.5–5.3)
POTASSIUM SERPL-SCNC: 4.2 MMOL/L — SIGNIFICANT CHANGE UP (ref 3.5–5.3)
RBC # BLD: 3.74 M/UL — LOW (ref 4.2–5.8)
RBC # FLD: 12.7 % — SIGNIFICANT CHANGE UP (ref 10.3–14.5)
SODIUM SERPL-SCNC: 135 MMOL/L — SIGNIFICANT CHANGE UP (ref 135–145)
VANCOMYCIN TROUGH SERPL-MCNC: 15.8 UG/ML — SIGNIFICANT CHANGE UP (ref 10–20)
WBC # BLD: 6.47 K/UL — SIGNIFICANT CHANGE UP (ref 3.8–10.5)
WBC # FLD AUTO: 6.47 K/UL — SIGNIFICANT CHANGE UP (ref 3.8–10.5)

## 2021-12-02 PROCEDURE — 86850 RBC ANTIBODY SCREEN: CPT

## 2021-12-02 PROCEDURE — 83036 HEMOGLOBIN GLYCOSYLATED A1C: CPT

## 2021-12-02 PROCEDURE — 80048 BASIC METABOLIC PNL TOTAL CA: CPT

## 2021-12-02 PROCEDURE — 87070 CULTURE OTHR SPECIMN AEROBIC: CPT

## 2021-12-02 PROCEDURE — 85610 PROTHROMBIN TIME: CPT

## 2021-12-02 PROCEDURE — 99285 EMERGENCY DEPT VISIT HI MDM: CPT

## 2021-12-02 PROCEDURE — 85027 COMPLETE CBC AUTOMATED: CPT

## 2021-12-02 PROCEDURE — 71045 X-RAY EXAM CHEST 1 VIEW: CPT

## 2021-12-02 PROCEDURE — 87075 CULTR BACTERIA EXCEPT BLOOD: CPT

## 2021-12-02 PROCEDURE — 82962 GLUCOSE BLOOD TEST: CPT

## 2021-12-02 PROCEDURE — 87184 SC STD DISK METHOD PER PLATE: CPT

## 2021-12-02 PROCEDURE — 86900 BLOOD TYPING SEROLOGIC ABO: CPT

## 2021-12-02 PROCEDURE — 96374 THER/PROPH/DIAG INJ IV PUSH: CPT

## 2021-12-02 PROCEDURE — 86140 C-REACTIVE PROTEIN: CPT

## 2021-12-02 PROCEDURE — 83735 ASSAY OF MAGNESIUM: CPT

## 2021-12-02 PROCEDURE — 80053 COMPREHEN METABOLIC PANEL: CPT

## 2021-12-02 PROCEDURE — 85025 COMPLETE CBC W/AUTO DIFF WBC: CPT

## 2021-12-02 PROCEDURE — 96375 TX/PRO/DX INJ NEW DRUG ADDON: CPT

## 2021-12-02 PROCEDURE — 73660 X-RAY EXAM OF TOE(S): CPT

## 2021-12-02 PROCEDURE — 82550 ASSAY OF CK (CPK): CPT

## 2021-12-02 PROCEDURE — 87635 SARS-COV-2 COVID-19 AMP PRB: CPT

## 2021-12-02 PROCEDURE — 88304 TISSUE EXAM BY PATHOLOGIST: CPT

## 2021-12-02 PROCEDURE — 87186 SC STD MICRODIL/AGAR DIL: CPT

## 2021-12-02 PROCEDURE — 87040 BLOOD CULTURE FOR BACTERIA: CPT

## 2021-12-02 PROCEDURE — 86901 BLOOD TYPING SEROLOGIC RH(D): CPT

## 2021-12-02 PROCEDURE — 80202 ASSAY OF VANCOMYCIN: CPT

## 2021-12-02 PROCEDURE — 99239 HOSP IP/OBS DSCHRG MGMT >30: CPT | Mod: GC

## 2021-12-02 PROCEDURE — 85730 THROMBOPLASTIN TIME PARTIAL: CPT

## 2021-12-02 PROCEDURE — 85652 RBC SED RATE AUTOMATED: CPT

## 2021-12-02 PROCEDURE — 84100 ASSAY OF PHOSPHORUS: CPT

## 2021-12-02 PROCEDURE — 36415 COLL VENOUS BLD VENIPUNCTURE: CPT

## 2021-12-02 RX ORDER — AMLODIPINE BESYLATE 2.5 MG/1
1 TABLET ORAL
Qty: 0 | Refills: 0 | DISCHARGE

## 2021-12-02 RX ORDER — LISINOPRIL 2.5 MG/1
2 TABLET ORAL
Qty: 10 | Refills: 0
Start: 2021-12-02

## 2021-12-02 RX ORDER — LISINOPRIL 2.5 MG/1
20 TABLET ORAL
Qty: 0 | Refills: 0 | DISCHARGE

## 2021-12-02 RX ORDER — MAGNESIUM SULFATE 500 MG/ML
1 VIAL (ML) INJECTION ONCE
Refills: 0 | Status: COMPLETED | OUTPATIENT
Start: 2021-12-02 | End: 2021-12-02

## 2021-12-02 RX ORDER — LISINOPRIL 2.5 MG/1
1 TABLET ORAL
Qty: 30 | Refills: 0
Start: 2021-12-02 | End: 2021-12-31

## 2021-12-02 RX ORDER — METOPROLOL TARTRATE 50 MG
1 TABLET ORAL
Qty: 0 | Refills: 0 | DISCHARGE

## 2021-12-02 RX ORDER — VANCOMYCIN HCL 1 G
1500 VIAL (EA) INTRAVENOUS EVERY 12 HOURS
Refills: 0 | Status: DISCONTINUED | OUTPATIENT
Start: 2021-12-02 | End: 2021-12-02

## 2021-12-02 RX ORDER — RIVAROXABAN 15 MG-20MG
20 KIT ORAL
Refills: 0 | Status: DISCONTINUED | OUTPATIENT
Start: 2021-12-02 | End: 2021-12-02

## 2021-12-02 RX ADMIN — Medication 100 GRAM(S): at 09:06

## 2021-12-02 RX ADMIN — LISINOPRIL 40 MILLIGRAM(S): 2.5 TABLET ORAL at 07:09

## 2021-12-02 RX ADMIN — PIPERACILLIN AND TAZOBACTAM 200 GRAM(S): 4; .5 INJECTION, POWDER, LYOPHILIZED, FOR SOLUTION INTRAVENOUS at 07:10

## 2021-12-02 RX ADMIN — Medication 300 MILLIGRAM(S): at 00:45

## 2021-12-02 RX ADMIN — Medication 2: at 08:45

## 2021-12-02 RX ADMIN — PANTOPRAZOLE SODIUM 40 MILLIGRAM(S): 20 TABLET, DELAYED RELEASE ORAL at 07:09

## 2021-12-02 RX ADMIN — Medication 40 MILLIGRAM(S): at 07:09

## 2021-12-02 RX ADMIN — Medication 12.5 MILLIGRAM(S): at 07:09

## 2021-12-02 RX ADMIN — OXYCODONE HYDROCHLORIDE 5 MILLIGRAM(S): 5 TABLET ORAL at 00:40

## 2021-12-02 NOTE — PROGRESS NOTE ADULT - ASSESSMENT
66M PMH HTN, DM, CAD w/ stent,  atrial flutter s/p ablation 2015, afib s/p cardioversion 2020, atrial tachycardia s/p RF ablation Oct 2021, now p/w Left 2nd digit diabetic infection. Of note CRP 63.3, ESR 70. Of note, XR foot impression: "osteopenia and suggestion of fragmentation of the distal phalanx of the second digit worrisome for osteomyelitis". Pt is now s/p left 2nd distal digit amputation with primary closure. CBC downtrending.     Plan:  - Dressed with betadine pain to digit, DSD, kerlix wrap  - F/U deep wound cx of the Left 2nd toe  - C/w empiric ABX; pending susceptibilities of Cx  - Pt can ambulate as tolerated on LLE with surgical shoe  - Pt is stable from podiatric standpoint and can follow up outpt w/ Dr. Alyssia Dow DPM for continued care within 1-2 weeks upon dispo to home.     Plan d/w Attending     Follow-up at [facility] within 1-2 weeks of discharge.   Address: [facility] [address] [place] [address] ([address] 2nd Floor, [place] [address].   Phone: [phone]; [phone]  Please call for an appointment.   66M PMH HTN, DM, CAD w/ stent,  atrial flutter s/p ablation 2015, afib s/p cardioversion 2020, atrial tachycardia s/p RF ablation Oct 2021, now p/w Left 2nd digit diabetic infection. Of note CRP 63.3, ESR 70. Of note, XR foot impression: "osteopenia and suggestion of fragmentation of the distal phalanx of the second digit worrisome for osteomyelitis". Pt is now s/p left 2nd distal digit amputation with primary closure. CBC downtrending.     Plan:  - Dressed with betadine pain to digit, DSD, kerlix wrap  - F/U deep wound cx of the Left 2nd toe  - C/w empiric ABX; pending susceptibilities of Cx  - Pt can ambulate as tolerated on LLE with surgical shoe  - pt can go home w/ PO doxy + augmentin   - Pt is stable from podiatric standpoint and can follow up outpt w/ Dr. Kristian DPM for continued care within 1-2 weeks upon dispo to home.     Plan d/w Attending     Patient should follow up with Dr. Tariq Townsend within 1 week of discharge.    Office information:          [place] Address- [address], [place] [address] Phone: [phone]         [place] Address- [address] [facility] [address], [place] [address] Phone: [phone]

## 2021-12-02 NOTE — DISCHARGE NOTE PROVIDER - NSDCFUADDAPPT_GEN_ALL_CORE_FT
Please follow up with Dr. Chris at his clinic on [address] on 12/8/21 at 3PM Please follow up with Dr. Chris at his clinic on [address] on 12/8/21 at 3PM    -Please follow up with Dr. Tariq Townsend (podiatry) on 12/13/21 at 230PM at the [facility]- [address], [place] [address] Phone: [phone]

## 2021-12-02 NOTE — PROGRESS NOTE ADULT - PROVIDER SPECIALTY LIST ADULT
Was the patient seen in the last year in this department? Yes    Does patient have an active prescription for medications requested? No     Received Request Via: Pharmacy  
Internal Medicine
Podiatry

## 2021-12-02 NOTE — DISCHARGE NOTE PROVIDER - NSDCMRMEDTOKEN_GEN_ALL_CORE_FT
aspirin 81 mg oral tablet: orally once a day  glipiZIDE: 10 milligram(s) orally  hydroCHLOROthiazide:   Janumet 50 mg-1000 mg oral tablet: 1 tab(s) orally 2 times a day  lisinopril: 20 milligram(s) orally once a day  Metoprolol Succinate ER 50 mg oral tablet, extended release: 1 tab(s) orally once a day  Norvasc 2.5 mg oral tablet: 1 tab(s) orally 2 times a day  sildenafil 100 mg oral tablet: 1 tab(s) orally once a day, As Needed  sotalol 80 mg oral tablet: 0.5 tab(s) orally 2 times a day  Xarelto 20 mg oral tablet: 1 tab(s) orally once a day (in the evening)   amoxicillin-clavulanate 875 mg-125 mg oral tablet: 1 tab(s) orally 2 times a day   aspirin 81 mg oral tablet: orally once a day  doxycycline hyclate 100 mg oral tablet: 1 tab(s) orally 2 times a day   glipiZIDE: 10 milligram(s) orally  hydroCHLOROthiazide:   Janumet 50 mg-1000 mg oral tablet: 1 tab(s) orally 2 times a day  lisinopril 20 mg oral tablet: 2 tab(s) orally once a day   sildenafil 100 mg oral tablet: 1 tab(s) orally once a day, As Needed  sotalol 80 mg oral tablet: 0.5 tab(s) orally 2 times a day  Xarelto 20 mg oral tablet: 1 tab(s) orally once a day (in the evening)   amoxicillin-clavulanate 875 mg-125 mg oral tablet: 1 tab(s) orally 2 times a day   aspirin 81 mg oral tablet: orally once a day  doxycycline hyclate 100 mg oral tablet: 1 tab(s) orally 2 times a day   glipiZIDE: 10 milligram(s) orally  hydroCHLOROthiazide:   Janumet 50 mg-1000 mg oral tablet: 1 tab(s) orally 2 times a day  lisinopril 40 mg oral tablet: 1 tab(s) orally once a day   sildenafil 100 mg oral tablet: 1 tab(s) orally once a day, As Needed  sotalol 80 mg oral tablet: 0.5 tab(s) orally 2 times a day  Xarelto 20 mg oral tablet: 1 tab(s) orally once a day (in the evening)

## 2021-12-02 NOTE — DISCHARGE NOTE NURSING/CASE MANAGEMENT/SOCIAL WORK - NSDCPEFALRISK_GEN_ALL_CORE
For information on Fall & Injury Prevention, visit: [email or web address] OR  [email or web address] OR  https://www.cdc.gov/steadi/patient.html

## 2021-12-02 NOTE — PROGRESS NOTE ADULT - SUBJECTIVE AND OBJECTIVE BOX
Patient is a 66y old  Male who presents with a chief complaint of Osteomyelitis (01 Dec 2021 22:00)      INTERVAL HPI/ OVERNIGHT EVENTS: Pt evaluated at bedside. RACHEL o/n. Pt denies pain to surgical site. No new complaints at this time. Denies N/V/F/SOB/CP at this time.      LABS                        10.9   6.47  )-----------( 228      ( 02 Dec 2021 07:18 )             33.2     12-02    135  |  102  |  14  ----------------------------<  200<H>  4.2   |  25  |  1.19    Ca    8.6      02 Dec 2021 07:18  Phos  3.0     12-02  Mg     1.9     12-02    TPro  7.9  /  Alb  4.5  /  TBili  0.4  /  DBili  x   /  AST  14  /  ALT  9<L>  /  AlkPhos  91  11-30    PT/INR - ( 01 Dec 2021 10:57 )   PT: 16.7 sec;   INR: 1.41          PTT - ( 01 Dec 2021 10:57 )  PTT:32.4 sec    ICU Vital Signs Last 24 Hrs  T(C): 37.2 (02 Dec 2021 06:04), Max: 37.6 (01 Dec 2021 12:23)  T(F): 98.9 (02 Dec 2021 06:04), Max: 99.7 (01 Dec 2021 12:23)  HR: 67 (02 Dec 2021 06:04) (54 - 67)  BP: 123/72 (02 Dec 2021 06:04) (114/62 - 136/79)  BP(mean): 97 (01 Dec 2021 21:30) (82 - 97)  ABP: --  ABP(mean): --  RR: 18 (02 Dec 2021 06:04) (17 - 21)  SpO2: 100% (02 Dec 2021 06:04) (98% - 100%)      RADIOLOGY    MICROBIOLOGY    PHYSICAL EXAM  Lower Extremity Focused:  Vasc: Palpable pulses b/l, WWP, edematous L foot and leg with erythema present +1 pitting edema   Derm: s/p left distal 2nd digit amputation w/ primary closure  Neuro: Protective sensations mildly diminished   MSK: MMSE 5/5 in all compartments

## 2021-12-02 NOTE — DISCHARGE NOTE NURSING/CASE MANAGEMENT/SOCIAL WORK - NSDCFUADDAPPT_GEN_ALL_CORE_FT
Please follow up with Dr. Chris at his clinic on [address] on 12/8/21 at 3PM    -Please follow up with Dr. Tariq Townsend (podiatry) on 12/13/21 at 230PM at the [facility]- [address], [place] [address] Phone: [phone]

## 2021-12-02 NOTE — DISCHARGE NOTE PROVIDER - HOSPITAL COURSE
#Discharge: do not delete    Patient is 67 yo M with past medical history of HTN, T2DM, A Fib, A flutter (s/p RF ablation in oct 2021)   Presented with swelling, erythema and pain of the left 2nd toe found to have osteomyelitis of L 2nd toe  Problem List/Main Diagnoses (system-based):      Problem/Plan - 1:  ·  Problem: Acute osteomyelitis.   ·  Plan: Presenting with swelling and erythema of 2nd digit of L foot worsening over last 2 weeks  - OM dx supported by + probe to bone, xray findings, wound culture + gram positive cocci in pairs, ESR, CRP elevated.  - s/p amputation of L 2nd toe. Tissue culture with clean margins. Received vancomycin and zosyn while IP    Plan  - Discharge on PO augmentin 875 bid and PO doxycycline 100mg bid for 2 weeks   -Patient should follow up with Dr. Tariq Townsend within 1 week of discharge.    Office information:          [place] Address- [address], [place] [address] Phone: [phone]         [place] Address- [address], [place] [address] Phone: [phone]     Problem/Plan - 2:  ·  Problem: Diabetes.   ·  Plan: At home on Janumet  bid, and glipizide 10mg daily  - hold oral DM meds  - HbA1c 7.8  - start insulin sliding scale but likely will need coverage with long acting pending diet resumption  - Restart diet and monitor needs following procedure.     Problem/Plan - 3:  ·  Problem: Hypertension.   ·  Plan: At home on Amlodipine 2.5 mg BID, Lisinopril 20mg QD, HCTZ 12.5 mg QD    Plan  - Will d/c amlodipine  - inc lisinopril to 40mg Qd (hold for SBP< 100)  - c/w hctz 12.5 Qd.     Problem/Plan - 4:  ·  Problem: Atrial fibrillation and flutter.   ·  Plan: Currently in NSR. S/P Cardioversion, and RF ablation in October 2021.  - at home on Xarelto, last dose on 11/29 in PM, [id number] while IP  - Hold lovenox for procedure this PM  - d/c'd MTP 50XL as pt no longer taking per PCP  - confirmed pt is taking Sotalol 40mg BID (home med, as per EP), will resume  - Can restart xarelto tomorrow.     Problem/Plan - 5:  ·  Problem: Groin hematoma.   ·  Plan: Presenting with L lower extremity swelling, which started few days after EP procedure with R + L groin access. US negative for DVT, but with 5cm hematoma. Swelling is most likely 2/2 to hematoma, currently improving. As per EP, who saw patient on 11/19 no interventions needed. Since then - swelling although still present, significantly improved.    Plan  - continue to monitor.     Problem/Plan - 6:  ·  Problem: Prophylactic measure.   ·  Plan: DVT ppx: None  Diet: DASH, carbs consistent, NPO for now  Code: FULL  Fluids: None.    Inpatient treatment course:   New medications:   Labs to be followed outpatient:   Exam to be followed outpatient:                #Discharge: do not delete    Patient is 65 yo M with past medical history of HTN, T2DM, A Fib, A flutter (s/p RF ablation in oct 2021)   Presented with swelling, erythema and pain of the left 2nd toe found to have osteomyelitis of L 2nd toe  Problem List/Main Diagnoses (system-based):      Problem/Plan - 1:  ·  Problem: Acute osteomyelitis.   ·  Plan: Presenting with swelling and erythema of 2nd digit of L foot worsening over last 2 weeks  - OM dx supported by + probe to bone, xray findings, wound culture + gram positive cocci in pairs, ESR, CRP elevated.  - s/p amputation of L 2nd toe. Tissue culture with clean margins. Received vancomycin and zosyn while IP    Plan  - Discharge on PO augmentin 875 bid and PO doxycycline 100mg bid for 2 weeks   -Patient should follow up with Dr. Tariq Townsend within 1 week of discharge.    Office information:          [place] Address- [address], [place] [address] Phone: [phone]         [place] Address- [address] [facility] [address], [place] [address] Phone: [phone]  - OP PCP f/u with Dr. Chris's office [address] @ 3PM on 12/8/21     Problem/Plan - 2:  ·  Problem: Diabetes.   ·  Plan: At home on Janumet  bid, and glipizide 10mg daily  - HbA1c 7.8  - start insulin sliding scale but likely will need coverage with long acting pending diet resumption  - Restart home regimen  - OP f/u for optimizing glucose control     Problem/Plan - 3:  ·  Problem: Hypertension.   ·  Plan: At home on Amlodipine 2.5 mg BID, Lisinopril 20mg QD, HCTZ 12.5 mg QD    Plan  - Stopped amlodipine given no Hx of resistant hypertension, inc lisinopril to 40mg  - c/w lisinopril 40mg Qd   - c/w hctz 12.5 Qd.  - c/w sotalol 40mg BID     Problem/Plan - 4:  ·  Problem: Atrial fibrillation and flutter.   ·  Plan: Currently in NSR. S/P Cardioversion, and RF ablation in October 2021.  - at home on Xarelto, last dose on 11/29 in PM, [id number] while IP  - Restarted home xarelto  - d/c'd MTP 50XL as pt no longer taking per PCP  - -c/w xarelto and sotalol at home   Problem/Plan - 5:  ·  Problem: Groin hematoma.   ·  Plan: Presenting with L lower extremity swelling, which started few days after EP procedure with R + L groin access. US negative for DVT, but with 5cm hematoma. Swelling is most likely 2/2 to hematoma, currently improving. As per EP, who saw patient on 11/19 no interventions needed. Since then - swelling although still present, significantly improved.    Plan  - Stable     Problem/Plan - 6:  ·  Problem: Prophylactic measure.   ·  Plan: DVT ppx: None  Diet: DASH, carbs consistent, NPO for now  Code: FULL  Fluids: None.    Inpatient treatment course: Vancomycin, zosyn  New medications: augmentin 875mg bid, doxycycline 100mg bid for 2 weeks  Labs to be followed outpatient: CBC, HBA1c  Exam to be followed outpatient: cardiovascular, groin, lower extremity, L toe

## 2021-12-02 NOTE — DISCHARGE NOTE NURSING/CASE MANAGEMENT/SOCIAL WORK - PATIENT PORTAL LINK FT
You can access the FollowMyHealth Patient Portal offered by [facility] by registering at the following website: [email or web address]. By joining [employer]’s FollowMyHealth portal, you will also be able to view your health information using other applications (apps) compatible with our system.

## 2021-12-02 NOTE — DISCHARGE NOTE PROVIDER - NSDCCPCAREPLAN_GEN_ALL_CORE_FT
PRINCIPAL DISCHARGE DIAGNOSIS  Diagnosis: Osteomyelitis of ankle or foot, acute  Assessment and Plan of Treatment: You were diagnosed with osteomyelitis of your left 2nd toe. Osteomyelitis is an infection in a bone. Infections can reach a bone by traveling through the bloodstream or spreading from nearby tissue. Infections can also begin in the bone itself if an injury exposes the bone to germs.  Smokers and people with chronic health conditions, such as diabetes or kidney failure, are more at risk of developing osteomyelitis. People who have diabetes may develop osteomyelitis in their feet if they have foot ulcers.  We had the podiatry team see you for this condition for which they amputated your toe. Prior to the amputation your wound cultures were positive for gram positive cocci in pairs whcih is usually indicative of a bacterial infection. We gave you antibiotics while you were here to treat the infection as well.  You are now able to be discharged home. You will need to continue to take the following antibiotics for 2 weeks  1. Augmentin 875mg 1 tablet twice a day by mouth  2. Doxycycline 100mg 1 tablet twice a day by mouth  Please seek immiedate medical care if you develop a fever, chills, sweats, worsening pain or any other signs that you may have a infection        SECONDARY DISCHARGE DIAGNOSES  Diagnosis: Diabetes  Assessment and Plan of Treatment: We checked your HbA1c while you were which was slightly elevated at 7.8. We did not make any changes to your home medications but please follow up with your PCP to optimize your blood glucose control.  You can resume your janumet 50/1000 twice a day and glipizide 10 before meals for now

## 2021-12-03 LAB
-  CEFAZOLIN: SIGNIFICANT CHANGE UP
-  CLINDAMYCIN: SIGNIFICANT CHANGE UP
-  ERYTHROMYCIN: SIGNIFICANT CHANGE UP
-  LINEZOLID: SIGNIFICANT CHANGE UP
-  OXACILLIN: SIGNIFICANT CHANGE UP
-  RIFAMPIN: SIGNIFICANT CHANGE UP
-  TRIMETHOPRIM/SULFAMETHOXAZOLE: SIGNIFICANT CHANGE UP
-  VANCOMYCIN: SIGNIFICANT CHANGE UP
METHOD TYPE: SIGNIFICANT CHANGE UP

## 2021-12-04 LAB
-  CLINDAMYCIN: SIGNIFICANT CHANGE UP
-  ERYTHROMYCIN: SIGNIFICANT CHANGE UP
-  LEVOFLOXACIN: SIGNIFICANT CHANGE UP
-  PENICILLIN: SIGNIFICANT CHANGE UP
-  VANCOMYCIN: SIGNIFICANT CHANGE UP
CULTURE RESULTS: SIGNIFICANT CHANGE UP
CULTURE RESULTS: SIGNIFICANT CHANGE UP
METHOD TYPE: SIGNIFICANT CHANGE UP
ORGANISM # SPEC MICROSCOPIC CNT: SIGNIFICANT CHANGE UP
SPECIMEN SOURCE: SIGNIFICANT CHANGE UP

## 2021-12-06 LAB
-  AMPICILLIN: SIGNIFICANT CHANGE UP
-  CEFAZOLIN: SIGNIFICANT CHANGE UP
-  CLINDAMYCIN: SIGNIFICANT CHANGE UP
-  ERYTHROMYCIN: SIGNIFICANT CHANGE UP
-  LINEZOLID: SIGNIFICANT CHANGE UP
-  OXACILLIN: SIGNIFICANT CHANGE UP
-  RIFAMPIN: SIGNIFICANT CHANGE UP
-  TRIMETHOPRIM/SULFAMETHOXAZOLE: SIGNIFICANT CHANGE UP
-  VANCOMYCIN: SIGNIFICANT CHANGE UP
-  VANCOMYCIN: SIGNIFICANT CHANGE UP
CULTURE RESULTS: SIGNIFICANT CHANGE UP
METHOD TYPE: SIGNIFICANT CHANGE UP
METHOD TYPE: SIGNIFICANT CHANGE UP
ORGANISM # SPEC MICROSCOPIC CNT: SIGNIFICANT CHANGE UP
SPECIMEN SOURCE: SIGNIFICANT CHANGE UP

## 2021-12-07 DIAGNOSIS — E11.621 TYPE 2 DIABETES MELLITUS WITH FOOT ULCER: ICD-10-CM

## 2021-12-07 DIAGNOSIS — E11.69 TYPE 2 DIABETES MELLITUS WITH OTHER SPECIFIED COMPLICATION: ICD-10-CM
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
## 2021-12-07 DIAGNOSIS — E11.51 TYPE 2 DIABETES MELLITUS WITH DIABETIC PERIPHERAL ANGIOPATHY WITHOUT GANGRENE: ICD-10-CM

## 2021-12-07 DIAGNOSIS — L97.529 NON-PRESSURE CHRONIC ULCER OF OTHER PART OF LEFT FOOT WITH UNSPECIFIED SEVERITY: ICD-10-CM

## 2021-12-07 DIAGNOSIS — L76.32 POSTPROCEDURAL HEMATOMA OF SKIN AND SUBCUTANEOUS TISSUE FOLLOWING OTHER PROCEDURE: ICD-10-CM

## 2021-12-07 DIAGNOSIS — M86.172 OTHER ACUTE OSTEOMYELITIS, LEFT ANKLE AND FOOT: ICD-10-CM

## 2021-12-07 DIAGNOSIS — I48.91 UNSPECIFIED ATRIAL FIBRILLATION: ICD-10-CM

## 2021-12-07 DIAGNOSIS — Z79.4 LONG TERM (CURRENT) USE OF INSULIN: ICD-10-CM

## 2021-12-07 DIAGNOSIS — I42.9 CARDIOMYOPATHY, UNSPECIFIED: ICD-10-CM

## 2021-12-07 DIAGNOSIS — I48.92 UNSPECIFIED ATRIAL FLUTTER: ICD-10-CM

## 2021-12-07 DIAGNOSIS — E11.65 TYPE 2 DIABETES MELLITUS WITH HYPERGLYCEMIA: ICD-10-CM

## 2021-12-10 LAB — SURGICAL PATHOLOGY STUDY: SIGNIFICANT CHANGE UP

## 2021-12-20 ENCOUNTER — NON-APPOINTMENT (OUTPATIENT)
Age: 66
End: 2021-12-20

## 2021-12-29 ENCOUNTER — APPOINTMENT (OUTPATIENT)
Dept: HEART AND VASCULAR | Facility: CLINIC | Age: 66
End: 2021-12-29
Payer: COMMERCIAL

## 2021-12-29 ENCOUNTER — NON-APPOINTMENT (OUTPATIENT)
Age: 66
End: 2021-12-29

## 2021-12-29 VITALS
BODY MASS INDEX: 3.29 KG/M2 | OXYGEN SATURATION: 96 % | DIASTOLIC BLOOD PRESSURE: 85 MMHG | SYSTOLIC BLOOD PRESSURE: 179 MMHG | TEMPERATURE: 97.5 F | HEART RATE: 72 BPM | HEIGHT: 70 IN | WEIGHT: 23 LBS

## 2021-12-29 PROCEDURE — 99214 OFFICE O/P EST MOD 30 MIN: CPT | Mod: 25

## 2021-12-29 PROCEDURE — 93000 ELECTROCARDIOGRAM COMPLETE: CPT

## 2021-12-29 NOTE — DISCUSSION/SUMMARY
[FreeTextEntry1] : Mr Banks is a 64 year old male with HTN, diabetes and atrial flutter s/p ablation 2015, who presents for followup s/p AF ablation in Oct 2021. Patient was seen in clinic in November for leg swelling, DVT ruled out, admitted in Dec for acute osteomyelitis and underwent right toe amputation. \par \par Patient reported shortness of breath, low suspicion for clinical recurrence of AF and patient is in NSR today. Recommend patient be tested for COVID. Patient understands and will call with any questions or concerns. He will receive a TTM monitor and will begin weekly transmissions. Patient will followup in February for 3M visit and CT scan.

## 2021-12-29 NOTE — HISTORY OF PRESENT ILLNESS
[FreeTextEntry1] : Mr Banks is a 64 year old male with HTN, diabetes and atrial flutter s/p ablation 2015, who was found to be in atrial fibrillation s/p cardioversion 1/10/20, who presents for follow up s/p ablation on 10/29/21. \par \par Patient presented to ER with palpitations on 10/28/21, found to be in afib with RVR. Enrolled in [employer] and randomized to cryoablation wit hAT ablation.\par \marshall Previously presented with left leg swelling and tenderness at the groin area, DVT ruled out. Hospitalized for acute osteomyelitis in Dec 2021, right toe amputation. \par \par He is maintained on xarelto, sotalol and metoprolol. Last Thursday felt some shortness of breath at rest and dyspnea upon exertion. Reports could walk 2 blocks without stopping. Associated with fatigue.  He denied any palpitations, orthopnea, syncope or near syncope, no lightheadedness, dizziness. \par \marshall Has not received monitor from [employer], awaiting instructions for weekly transmissions. \par  \par \par

## 2021-12-29 NOTE — PHYSICAL EXAM
[General Appearance - Well Developed] : well developed [Normal Appearance] : normal appearance [Well Groomed] : well groomed [General Appearance - Well Nourished] : well nourished [No Deformities] : no deformities [General Appearance - In No Acute Distress] : no acute distress [Normal Oral Mucosa] : normal oral mucosa [Respiration, Rhythm And Depth] : normal respiratory rhythm and effort [Exaggerated Use Of Accessory Muscles For Inspiration] : no accessory muscle use [Auscultation Breath Sounds / Voice Sounds] : lungs were clear to auscultation bilaterally [Heart Rate And Rhythm] : heart rate and rhythm were normal [Heart Sounds] : normal S1 and S2 [Edema] : no peripheral edema present [Abnormal Walk] : normal gait [Gait - Sufficient For Exercise Testing] : the gait was sufficient for exercise testing [Skin Color & Pigmentation] : normal skin color and pigmentation [] : no rash [Oriented To Time, Place, And Person] : oriented to person, place, and time [Impaired Insight] : insight and judgment were intact [Affect] : the affect was normal [Mood] : the mood was normal [No Anxiety] : not feeling anxious [Well Developed] : well developed [Well Nourished] : well nourished [No Acute Distress] : no acute distress [Normal Conjunctiva] : normal conjunctiva [Normal Venous Pressure] : normal venous pressure [No Carotid Bruit] : no carotid bruit [Normal S1, S2] : normal S1, S2 [No Murmur] : no murmur [No Rub] : no rub [No Gallop] : no gallop [Clear Lung Fields] : clear lung fields [Good Air Entry] : good air entry [No Respiratory Distress] : no respiratory distress  [Soft] : abdomen soft [Non Tender] : non-tender [No Masses/organomegaly] : no masses/organomegaly [Normal Bowel Sounds] : normal bowel sounds [Normal Gait] : normal gait [No Edema] : no edema [No Cyanosis] : no cyanosis [No Clubbing] : no clubbing [No Varicosities] : no varicosities [No Rash] : no rash [No Skin Lesions] : no skin lesions [Moves all extremities] : moves all extremities [No Focal Deficits] : no focal deficits [Normal Speech] : normal speech [Alert and Oriented] : alert and oriented [Normal memory] : normal memory

## 2021-12-29 NOTE — HISTORY OF PRESENT ILLNESS
[FreeTextEntry1] : Mr Banks is a 64 year old male with HTN, diabetes and atrial flutter s/p ablation 2015, who was found to be in atrial fibrillation s/p cardioversion 1/10/20, who presents for follow up s/p ablation on 10/29/21. \par \par Patient presented to ER with palpitations on 10/28/21, found to be in afib with RVR. Enrolled in [employer] Trial and randomized to cryoablation wit hAT ablation.\par \marshall Previously presented with left leg swelling and tenderness at the groin area, DVT ruled out. Hospitalized for acute osteomyelitis in Dec 2021, right toe amputation. \par \par He is maintained on xarelto, sotalol and metoprolol. Last Thursday felt some shortness of breath at rest and dyspnea upon exertion. Reports could walk 2 blocks without stopping. Associated with fatigue.  He denied any palpitations, orthopnea, syncope or near syncope, no lightheadedness, dizziness. \par \marshall Has not received monitor from [employer], awaiting instructions for weekly transmissions. \par  \par \par

## 2022-01-28 RX ORDER — SOTALOL HYDROCHLORIDE 80 MG/1
80 TABLET ORAL TWICE DAILY
Qty: 90 | Refills: 0 | Status: DISCONTINUED | COMMUNITY
Start: 2020-01-08 | End: 2022-01-28

## 2022-02-02 ENCOUNTER — APPOINTMENT (OUTPATIENT)
Dept: HEART AND VASCULAR | Facility: CLINIC | Age: 67
End: 2022-02-02

## 2022-03-02 ENCOUNTER — OUTPATIENT (OUTPATIENT)
Dept: OUTPATIENT SERVICES | Facility: HOSPITAL | Age: 67
LOS: 1 days | End: 2022-03-02
Payer: SUBSIDIZED

## 2022-03-02 ENCOUNTER — RESULT REVIEW (OUTPATIENT)
Age: 67
End: 2022-03-02

## 2022-03-02 ENCOUNTER — APPOINTMENT (OUTPATIENT)
Dept: CT IMAGING | Facility: HOSPITAL | Age: 67
End: 2022-03-02
Payer: COMMERCIAL

## 2022-03-02 DIAGNOSIS — Z95.5 PRESENCE OF CORONARY ANGIOPLASTY IMPLANT AND GRAFT: Chronic | ICD-10-CM

## 2022-03-02 LAB — POCT ISTAT CREATININE: 1 MG/DL — SIGNIFICANT CHANGE UP (ref 0.5–1.3)

## 2022-03-02 PROCEDURE — 75572 CT HRT W/3D IMAGE: CPT | Mod: 26

## 2022-03-02 PROCEDURE — 82565 ASSAY OF CREATININE: CPT

## 2022-03-02 PROCEDURE — 75572 CT HRT W/3D IMAGE: CPT

## 2022-05-12 ENCOUNTER — NON-APPOINTMENT (OUTPATIENT)
Age: 67
End: 2022-05-12

## 2022-06-03 ENCOUNTER — NON-APPOINTMENT (OUTPATIENT)
Age: 67
End: 2022-06-03

## 2022-10-19 ENCOUNTER — APPOINTMENT (OUTPATIENT)
Dept: HEART AND VASCULAR | Facility: CLINIC | Age: 67
End: 2022-10-19

## 2022-10-19 ENCOUNTER — NON-APPOINTMENT (OUTPATIENT)
Age: 67
End: 2022-10-19

## 2022-10-19 VITALS — HEART RATE: 82 BPM | TEMPERATURE: 97 F

## 2022-10-19 VITALS — DIASTOLIC BLOOD PRESSURE: 98 MMHG | SYSTOLIC BLOOD PRESSURE: 170 MMHG

## 2022-10-19 VITALS — HEIGHT: 70 IN | WEIGHT: 217 LBS | BODY MASS INDEX: 31.07 KG/M2

## 2022-10-19 PROCEDURE — 93000 ELECTROCARDIOGRAM COMPLETE: CPT

## 2022-10-19 PROCEDURE — ZZZZZ: CPT

## 2022-10-19 RX ORDER — METOPROLOL SUCCINATE 50 MG/1
50 TABLET, EXTENDED RELEASE ORAL
Qty: 30 | Refills: 5 | Status: DISCONTINUED | COMMUNITY
Start: 2020-01-08 | End: 2022-10-19

## 2022-10-19 NOTE — PHYSICAL EXAM
[Well Developed] : well developed [Well Nourished] : well nourished [No Acute Distress] : no acute distress [Normal Conjunctiva] : normal conjunctiva [Normal Venous Pressure] : normal venous pressure [Normal S1, S2] : normal S1, S2 [Clear Lung Fields] : clear lung fields [Good Air Entry] : good air entry [No Respiratory Distress] : no respiratory distress  [Soft] : abdomen soft [Normal Gait] : normal gait [No Edema] : no edema [No Rash] : no rash [Moves all extremities] : moves all extremities [No Focal Deficits] : no focal deficits [Normal Speech] : normal speech [Alert and Oriented] : alert and oriented [Normal memory] : normal memory

## 2022-10-19 NOTE — DISCUSSION/SUMMARY
[FreeTextEntry1] : Mr Banks is a 67 year old male with HTN, diabetes and atrial flutter s/p ablation 2015, who presents for followup s/p AF ablation in Oct 2021. He is in NSR today with no symptoms concerning for recurrent arrhythmia.  He remains on oral anticoagulation and is off antiarrhythmic medications.  No changes made today.  Follow up in 1 year or sooner if needed.  He knows to call with any questions or concerns.   [EKG obtained to assist in diagnosis and management of assessed problem(s)] : EKG obtained to assist in diagnosis and management of assessed problem(s)

## 2022-10-19 NOTE — HISTORY OF PRESENT ILLNESS
[FreeTextEntry1] : Mr Banks is a 67 year old male with HTN, diabetes and atrial flutter s/p ablation 2015, who was found to be in atrial fibrillation s/p cardioversion 1/10/20, who presents for follow up s/p ablation on 10/29/21. \par \par Patient presented to ER with palpitations on 10/28/21, found to be in afib with RVR. Enrolled in [employer] and randomized to cryoablation and also had an AT ablation.\par \marshall Previously presented with left leg swelling and tenderness at the groin area, DVT ruled out. Hospitalized for acute osteomyelitis in Dec 2021, right toe amputation. \par \par He is maintained on xarelto and is now off sotalol and metoprolol. He feels well and has no complaints.  He denies any palpitations, chest pain, SOB, syncope, near syncope,orthopnea, PND.  His weekly transmission for trail with no issues.  He did not wear his month monitor secondary to his preference / work schedule.\par  \par

## 2023-06-29 ENCOUNTER — NON-APPOINTMENT (OUTPATIENT)
Age: 68
End: 2023-06-29

## 2023-11-10 ENCOUNTER — APPOINTMENT (OUTPATIENT)
Dept: NEUROLOGY | Facility: CLINIC | Age: 68
End: 2023-11-10

## 2024-03-13 ENCOUNTER — APPOINTMENT (OUTPATIENT)
Dept: HEART AND VASCULAR | Facility: CLINIC | Age: 69
End: 2024-03-13
Payer: COMMERCIAL

## 2024-03-13 ENCOUNTER — NON-APPOINTMENT (OUTPATIENT)
Age: 69
End: 2024-03-13

## 2024-03-13 VITALS
WEIGHT: 235 LBS | TEMPERATURE: 95 F | SYSTOLIC BLOOD PRESSURE: 179 MMHG | DIASTOLIC BLOOD PRESSURE: 85 MMHG | BODY MASS INDEX: 33.64 KG/M2 | HEIGHT: 70 IN | HEART RATE: 73 BPM

## 2024-03-13 DIAGNOSIS — I10 ESSENTIAL (PRIMARY) HYPERTENSION: ICD-10-CM

## 2024-03-13 DIAGNOSIS — I48.91 UNSPECIFIED ATRIAL FIBRILLATION: ICD-10-CM

## 2024-03-13 DIAGNOSIS — E11.9 TYPE 2 DIABETES MELLITUS W/OUT COMPLICATIONS: ICD-10-CM

## 2024-03-13 PROCEDURE — 93000 ELECTROCARDIOGRAM COMPLETE: CPT

## 2024-03-13 PROCEDURE — 99214 OFFICE O/P EST MOD 30 MIN: CPT | Mod: 25

## 2024-03-13 RX ORDER — AMLODIPINE BESYLATE 2.5 MG/1
2.5 TABLET ORAL
Refills: 0 | Status: DISCONTINUED | COMMUNITY
Start: 2020-01-08 | End: 2024-03-13

## 2024-03-18 NOTE — HISTORY OF PRESENT ILLNESS
[FreeTextEntry1] : Mr Banks is a 68 year old male with HTN, diabetes and atrial flutter s/p ablation 2015, who was found to be in atrial fibrillation s/p cardioversion 1/10/20, who is also s/p ablation 10/29/21. Since ablation, patient has been feeling well, without complaints or recurrence of atrial fibrillation. He takes both xarelto and aspirin daily.  He denies CP, palpitations, SOB, near syncope, orthopnea, PND, LE edema, or other complaints.

## 2024-03-18 NOTE — DISCUSSION/SUMMARY
[EKG obtained to assist in diagnosis and management of assessed problem(s)] : EKG obtained to assist in diagnosis and management of assessed problem(s) [FreeTextEntry1] : 68 year old male with HTN, diabetes and atrial flutter s/p ablation 2015, who presents for followup s/p AF ablation in Oct 2021.   1) Atrial Fibrillation  He is in NSR today with no symptoms concerning for recurrent arrhythmia.  He remains on oral anticoagulation and is off antiarrhythmic medications.  No changes made today.  2) Stroke Prevention  He should continue his xarelto given his CHADsVASC score is at least 3   3) Hypertension  Recommend patient to continue to follow up with his cardiologist, Dr. Demarco Chris to monitor his blood pressure.  Patient to follow up in 1 year or sooner if needed.  He knows to call with any questions or concerns.

## 2024-03-18 NOTE — ADDENDUM
[FreeTextEntry1] :  I, Bebe Garcia, am scribing for and the presence of Dr. Nolen the following sections: HPI, PMH,Family/social history, ROS, Physical Exam, Assessment / Plan.  I, Dru Nolen, hereby attest that the medical record entry for this patient accurately reflects signatures/notations that I made on the Date of Service in my capacity as an Attending Physician when I treated/diagnosed the above patient. I do hereby attest that this information is true, accurate and complete to the best of my knowledge.  I was present for the entire visit and supervised the entire visit including assessing the patient, conducting exam and establishing the plan of care.  I personally performed the evaluation and management services and agree with the plan as outlined above.

## 2025-03-11 ENCOUNTER — NON-APPOINTMENT (OUTPATIENT)
Age: 70
End: 2025-03-11

## 2025-03-11 ENCOUNTER — APPOINTMENT (OUTPATIENT)
Dept: HEART AND VASCULAR | Facility: CLINIC | Age: 70
End: 2025-03-11

## 2025-03-11 VITALS
OXYGEN SATURATION: 100 % | WEIGHT: 230 LBS | HEART RATE: 68 BPM | HEIGHT: 70 IN | BODY MASS INDEX: 32.93 KG/M2 | SYSTOLIC BLOOD PRESSURE: 175 MMHG | DIASTOLIC BLOOD PRESSURE: 81 MMHG | TEMPERATURE: 97.8 F

## 2025-03-11 DIAGNOSIS — I48.91 UNSPECIFIED ATRIAL FIBRILLATION: ICD-10-CM

## 2025-03-11 DIAGNOSIS — I48.92 UNSPECIFIED ATRIAL FLUTTER: ICD-10-CM

## 2025-03-11 PROCEDURE — 99213 OFFICE O/P EST LOW 20 MIN: CPT | Mod: 25

## 2025-03-11 PROCEDURE — 93000 ELECTROCARDIOGRAM COMPLETE: CPT
